# Patient Record
Sex: FEMALE | Race: WHITE
[De-identification: names, ages, dates, MRNs, and addresses within clinical notes are randomized per-mention and may not be internally consistent; named-entity substitution may affect disease eponyms.]

---

## 2021-03-26 ENCOUNTER — HOSPITAL ENCOUNTER (OUTPATIENT)
Dept: HOSPITAL 8 - CVU | Age: 56
Discharge: HOME | End: 2021-03-26
Attending: INTERNAL MEDICINE
Payer: COMMERCIAL

## 2021-03-26 DIAGNOSIS — I08.1: Primary | ICD-10-CM

## 2021-03-26 DIAGNOSIS — R03.0: ICD-10-CM

## 2021-03-26 PROCEDURE — 93306 TTE W/DOPPLER COMPLETE: CPT

## 2022-01-18 ENCOUNTER — PRE-ADMISSION TESTING (OUTPATIENT)
Dept: ADMISSIONS | Facility: MEDICAL CENTER | Age: 57
End: 2022-01-18
Attending: ORTHOPAEDIC SURGERY
Payer: COMMERCIAL

## 2022-01-18 DIAGNOSIS — Z01.812 PRE-OPERATIVE LABORATORY EXAMINATION: ICD-10-CM

## 2022-01-18 DIAGNOSIS — Z01.810 PRE-OPERATIVE CARDIOVASCULAR EXAMINATION: ICD-10-CM

## 2022-01-18 LAB
ANION GAP SERPL CALC-SCNC: 11 MMOL/L (ref 7–16)
BUN SERPL-MCNC: 20 MG/DL (ref 8–22)
CALCIUM SERPL-MCNC: 9.2 MG/DL (ref 8.4–10.2)
CHLORIDE SERPL-SCNC: 104 MMOL/L (ref 96–112)
CO2 SERPL-SCNC: 25 MMOL/L (ref 20–33)
CREAT SERPL-MCNC: 0.8 MG/DL (ref 0.5–1.4)
EKG IMPRESSION: NORMAL
ERYTHROCYTE [DISTWIDTH] IN BLOOD BY AUTOMATED COUNT: 41.1 FL (ref 35.9–50)
GLUCOSE SERPL-MCNC: 81 MG/DL (ref 65–99)
HCT VFR BLD AUTO: 45.6 % (ref 37–47)
HGB BLD-MCNC: 15 G/DL (ref 12–16)
MCH RBC QN AUTO: 30.5 PG (ref 27–33)
MCHC RBC AUTO-ENTMCNC: 32.9 G/DL (ref 33.6–35)
MCV RBC AUTO: 92.7 FL (ref 81.4–97.8)
PLATELET # BLD AUTO: 254 K/UL (ref 164–446)
PMV BLD AUTO: 9.2 FL (ref 9–12.9)
POTASSIUM SERPL-SCNC: 4.1 MMOL/L (ref 3.6–5.5)
RBC # BLD AUTO: 4.92 M/UL (ref 4.2–5.4)
SODIUM SERPL-SCNC: 140 MMOL/L (ref 135–145)
WBC # BLD AUTO: 7.1 K/UL (ref 4.8–10.8)

## 2022-01-18 PROCEDURE — 80048 BASIC METABOLIC PNL TOTAL CA: CPT

## 2022-01-18 PROCEDURE — 36415 COLL VENOUS BLD VENIPUNCTURE: CPT

## 2022-01-18 PROCEDURE — 93005 ELECTROCARDIOGRAM TRACING: CPT

## 2022-01-18 PROCEDURE — 85027 COMPLETE CBC AUTOMATED: CPT

## 2022-01-18 PROCEDURE — 93010 ELECTROCARDIOGRAM REPORT: CPT | Performed by: INTERNAL MEDICINE

## 2022-01-18 PROCEDURE — 87640 STAPH A DNA AMP PROBE: CPT

## 2022-01-18 PROCEDURE — 87641 MR-STAPH DNA AMP PROBE: CPT

## 2022-01-18 RX ORDER — VIT C/B6/B5/MAGNESIUM/HERB 173 50-5-6-5MG
2 CAPSULE ORAL
COMMUNITY

## 2022-01-18 RX ORDER — METOPROLOL SUCCINATE 25 MG/1
25 TABLET, EXTENDED RELEASE ORAL DAILY
COMMUNITY

## 2022-01-18 RX ORDER — ESTRADIOL 0.05 MG/D
1 PATCH, EXTENDED RELEASE TRANSDERMAL
COMMUNITY

## 2022-01-18 RX ORDER — VITAMIN B COMPLEX
5000 TABLET ORAL DAILY
COMMUNITY

## 2022-01-19 NOTE — DISCHARGE PLANNING
DISCHARGE PLANNING NOTE - TOTAL JOINT    Procedure: Procedure(s):  ARTHROPLASTY, SHOULDER, TOTAL - AND REPAIRS AS INDICATED  Procedure Date: 2/1/2022  Insurance: Payor: [employer] / Plan: [employer] CHOICE PLUS / Product Type: *No Product type* /    Equipment currently available at home?  polar ice and oversized shirt.  Steps into the home? 0  Steps within the home? 0  Toilet height? ADA  Type of shower? walk-in shower  Who will be with you during your recovery? Spouse, Daniele.  Is Outpatient Physical Therapy set up after surgery? Yes  Did you take the Total Joint Class and where? Yes received NAON book during appt.  Planning same day discharge?Yes     This writer met with pt during her preadmission appointment. Pt states she has all needed equipment.  Home safety checklist reviewed and copy given to pt. Pt educated to dc criteria. All questions answered and verbalizes understanding of all instructions. No dc needs identified at this time. Anticipate dc to home without barriers.

## 2022-01-20 LAB
SCCMEC + MECA PNL NOSE NAA+PROBE: NEGATIVE
SCCMEC + MECA PNL NOSE NAA+PROBE: POSITIVE

## 2022-01-27 ENCOUNTER — PRE-ADMISSION TESTING (OUTPATIENT)
Dept: ADMISSIONS | Facility: MEDICAL CENTER | Age: 57
End: 2022-01-27
Attending: ORTHOPAEDIC SURGERY
Payer: COMMERCIAL

## 2022-01-27 DIAGNOSIS — Z01.812 PRE-OPERATIVE LABORATORY EXAMINATION: ICD-10-CM

## 2022-01-27 PROCEDURE — U0003 INFECTIOUS AGENT DETECTION BY NUCLEIC ACID (DNA OR RNA); SEVERE ACUTE RESPIRATORY SYNDROME CORONAVIRUS 2 (SARS-COV-2) (CORONAVIRUS DISEASE [COVID-19]), AMPLIFIED PROBE TECHNIQUE, MAKING USE OF HIGH THROUGHPUT TECHNOLOGIES AS DESCRIBED BY CMS-2020-01-R: HCPCS

## 2022-01-27 PROCEDURE — C9803 HOPD COVID-19 SPEC COLLECT: HCPCS

## 2022-01-27 PROCEDURE — U0005 INFEC AGEN DETEC AMPLI PROBE: HCPCS

## 2022-01-28 LAB
SARS-COV-2 RNA RESP QL NAA+PROBE: NOTDETECTED
SPECIMEN SOURCE: NORMAL

## 2022-01-31 ENCOUNTER — ANESTHESIA EVENT (OUTPATIENT)
Dept: SURGERY | Facility: MEDICAL CENTER | Age: 57
End: 2022-01-31
Payer: COMMERCIAL

## 2022-02-01 ENCOUNTER — ANESTHESIA (OUTPATIENT)
Dept: SURGERY | Facility: MEDICAL CENTER | Age: 57
End: 2022-02-01
Payer: COMMERCIAL

## 2022-02-01 ENCOUNTER — ANESTHESIA EVENT (OUTPATIENT)
Dept: SURGERY | Facility: MEDICAL CENTER | Age: 57
DRG: 909 | End: 2022-02-01
Payer: COMMERCIAL

## 2022-02-01 ENCOUNTER — HOSPITAL ENCOUNTER (OUTPATIENT)
Facility: MEDICAL CENTER | Age: 57
End: 2022-02-01
Attending: ORTHOPAEDIC SURGERY | Admitting: ORTHOPAEDIC SURGERY
Payer: COMMERCIAL

## 2022-02-01 ENCOUNTER — HOSPITAL ENCOUNTER (INPATIENT)
Facility: MEDICAL CENTER | Age: 57
LOS: 1 days | DRG: 909 | End: 2022-02-02
Attending: SURGERY | Admitting: SURGERY
Payer: COMMERCIAL

## 2022-02-01 ENCOUNTER — APPOINTMENT (OUTPATIENT)
Dept: RADIOLOGY | Facility: MEDICAL CENTER | Age: 57
End: 2022-02-01
Attending: ORTHOPAEDIC SURGERY
Payer: COMMERCIAL

## 2022-02-01 ENCOUNTER — ANESTHESIA (OUTPATIENT)
Dept: SURGERY | Facility: MEDICAL CENTER | Age: 57
DRG: 909 | End: 2022-02-01
Payer: COMMERCIAL

## 2022-02-01 VITALS
TEMPERATURE: 96.8 F | BODY MASS INDEX: 30.79 KG/M2 | OXYGEN SATURATION: 96 % | RESPIRATION RATE: 22 BRPM | DIASTOLIC BLOOD PRESSURE: 58 MMHG | WEIGHT: 180.34 LBS | SYSTOLIC BLOOD PRESSURE: 109 MMHG | HEIGHT: 64 IN | HEART RATE: 106 BPM

## 2022-02-01 PROBLEM — S45.002A: Status: ACTIVE | Noted: 2022-02-01

## 2022-02-01 PROBLEM — M19.019 SHOULDER ARTHRITIS: Status: ACTIVE | Noted: 2022-02-01

## 2022-02-01 LAB
ABO GROUP BLD: NORMAL
ABO GROUP BLD: NORMAL
BASOPHILS # BLD AUTO: 0.2 % (ref 0–1.8)
BASOPHILS # BLD: 0.03 K/UL (ref 0–0.12)
BLD GP AB SCN SERPL QL: NORMAL
BLD GP AB SCN SERPL QL: NORMAL
EOSINOPHIL # BLD AUTO: 0 K/UL (ref 0–0.51)
EOSINOPHIL NFR BLD: 0 % (ref 0–6.9)
ERYTHROCYTE [DISTWIDTH] IN BLOOD BY AUTOMATED COUNT: 40.9 FL (ref 35.9–50)
HCT VFR BLD AUTO: 36.6 % (ref 37–47)
HGB BLD-MCNC: 12.2 G/DL (ref 12–16)
IMM GRANULOCYTES # BLD AUTO: 0.04 K/UL (ref 0–0.11)
IMM GRANULOCYTES NFR BLD AUTO: 0.3 % (ref 0–0.9)
LYMPHOCYTES # BLD AUTO: 0.74 K/UL (ref 1–4.8)
LYMPHOCYTES NFR BLD: 5.9 % (ref 22–41)
MCH RBC QN AUTO: 30.5 PG (ref 27–33)
MCHC RBC AUTO-ENTMCNC: 33.3 G/DL (ref 33.6–35)
MCV RBC AUTO: 91.5 FL (ref 81.4–97.8)
MONOCYTES # BLD AUTO: 0.33 K/UL (ref 0–0.85)
MONOCYTES NFR BLD AUTO: 2.6 % (ref 0–13.4)
NEUTROPHILS # BLD AUTO: 11.43 K/UL (ref 2–7.15)
NEUTROPHILS NFR BLD: 91 % (ref 44–72)
NRBC # BLD AUTO: 0 K/UL
NRBC BLD-RTO: 0 /100 WBC
PLATELET # BLD AUTO: 251 K/UL (ref 164–446)
PMV BLD AUTO: 8.9 FL (ref 9–12.9)
RBC # BLD AUTO: 4 M/UL (ref 4.2–5.4)
RH BLD: NORMAL
RH BLD: NORMAL
WBC # BLD AUTO: 12.6 K/UL (ref 4.8–10.8)

## 2022-02-01 PROCEDURE — 700105 HCHG RX REV CODE 258: Performed by: ANESTHESIOLOGY

## 2022-02-01 PROCEDURE — 700111 HCHG RX REV CODE 636 W/ 250 OVERRIDE (IP): Performed by: ANESTHESIOLOGY

## 2022-02-01 PROCEDURE — 700101 HCHG RX REV CODE 250: Performed by: SURGERY

## 2022-02-01 PROCEDURE — 700102 HCHG RX REV CODE 250 W/ 637 OVERRIDE(OP): Performed by: ANESTHESIOLOGY

## 2022-02-01 PROCEDURE — 35860 EXPLORE LIMB VESSELS: CPT | Performed by: SURGERY

## 2022-02-01 PROCEDURE — A9270 NON-COVERED ITEM OR SERVICE: HCPCS | Performed by: ANESTHESIOLOGY

## 2022-02-01 PROCEDURE — C1757 CATH, THROMBECTOMY/EMBOLECT: HCPCS | Performed by: SURGERY

## 2022-02-01 PROCEDURE — 700111 HCHG RX REV CODE 636 W/ 250 OVERRIDE (IP): Performed by: SURGERY

## 2022-02-01 PROCEDURE — 500562 HCHG FIBERWIRE: Performed by: ORTHOPAEDIC SURGERY

## 2022-02-01 PROCEDURE — 73206 CT ANGIO UPR EXTRM W/O&W/DYE: CPT | Mod: LT

## 2022-02-01 PROCEDURE — 160009 HCHG ANES TIME/MIN: Performed by: SURGERY

## 2022-02-01 PROCEDURE — 500257: Performed by: SURGERY

## 2022-02-01 PROCEDURE — 160036 HCHG PACU - EA ADDL 30 MINS PHASE I: Performed by: ORTHOPAEDIC SURGERY

## 2022-02-01 PROCEDURE — 160035 HCHG PACU - 1ST 60 MINS PHASE I: Performed by: SURGERY

## 2022-02-01 PROCEDURE — 160009 HCHG ANES TIME/MIN: Performed by: ORTHOPAEDIC SURGERY

## 2022-02-01 PROCEDURE — 502240 HCHG MISC OR SUPPLY RC 0272: Performed by: ORTHOPAEDIC SURGERY

## 2022-02-01 PROCEDURE — 700111 HCHG RX REV CODE 636 W/ 250 OVERRIDE (IP): Performed by: ORTHOPAEDIC SURGERY

## 2022-02-01 PROCEDURE — 770001 HCHG ROOM/CARE - MED/SURG/GYN PRIV*

## 2022-02-01 PROCEDURE — 160036 HCHG PACU - EA ADDL 30 MINS PHASE I: Performed by: SURGERY

## 2022-02-01 PROCEDURE — 160002 HCHG RECOVERY MINUTES (STAT): Performed by: SURGERY

## 2022-02-01 PROCEDURE — 160048 HCHG OR STATISTICAL LEVEL 1-5: Performed by: ORTHOPAEDIC SURGERY

## 2022-02-01 PROCEDURE — 64415 NJX AA&/STRD BRCH PLXS IMG: CPT | Performed by: ORTHOPAEDIC SURGERY

## 2022-02-01 PROCEDURE — 700117 HCHG RX CONTRAST REV CODE 255: Performed by: ORTHOPAEDIC SURGERY

## 2022-02-01 PROCEDURE — 110454 HCHG SHELL REV 250: Performed by: SURGERY

## 2022-02-01 PROCEDURE — 86850 RBC ANTIBODY SCREEN: CPT

## 2022-02-01 PROCEDURE — 700101 HCHG RX REV CODE 250: Performed by: ANESTHESIOLOGY

## 2022-02-01 PROCEDURE — L8699 PROSTHETIC IMPLANT NOS: HCPCS | Performed by: ORTHOPAEDIC SURGERY

## 2022-02-01 PROCEDURE — C1776 JOINT DEVICE (IMPLANTABLE): HCPCS | Performed by: ORTHOPAEDIC SURGERY

## 2022-02-01 PROCEDURE — 160029 HCHG SURGERY MINUTES - 1ST 30 MINS LEVEL 4: Performed by: SURGERY

## 2022-02-01 PROCEDURE — 700105 HCHG RX REV CODE 258: Performed by: ORTHOPAEDIC SURGERY

## 2022-02-01 PROCEDURE — 34101 REMOVAL OF ARTERY CLOT: CPT | Performed by: SURGERY

## 2022-02-01 PROCEDURE — 160035 HCHG PACU - 1ST 60 MINS PHASE I: Performed by: ORTHOPAEDIC SURGERY

## 2022-02-01 PROCEDURE — A9270 NON-COVERED ITEM OR SERVICE: HCPCS | Performed by: SURGERY

## 2022-02-01 PROCEDURE — 160041 HCHG SURGERY MINUTES - EA ADDL 1 MIN LEVEL 4: Performed by: ORTHOPAEDIC SURGERY

## 2022-02-01 PROCEDURE — 700101 HCHG RX REV CODE 250: Performed by: ORTHOPAEDIC SURGERY

## 2022-02-01 PROCEDURE — 99226 PR SUBSEQUENT OBSERVATION CARE,LEVEL III: CPT | Mod: 57 | Performed by: SURGERY

## 2022-02-01 PROCEDURE — 86901 BLOOD TYPING SEROLOGIC RH(D): CPT

## 2022-02-01 PROCEDURE — 700105 HCHG RX REV CODE 258: Performed by: SURGERY

## 2022-02-01 PROCEDURE — 502000 HCHG MISC OR IMPLANTS RC 0278: Performed by: ORTHOPAEDIC SURGERY

## 2022-02-01 PROCEDURE — 160041 HCHG SURGERY MINUTES - EA ADDL 1 MIN LEVEL 4: Performed by: SURGERY

## 2022-02-01 PROCEDURE — 73020 X-RAY EXAM OF SHOULDER: CPT | Mod: LT

## 2022-02-01 PROCEDURE — 501838 HCHG SUTURE GENERAL: Performed by: SURGERY

## 2022-02-01 PROCEDURE — 160029 HCHG SURGERY MINUTES - 1ST 30 MINS LEVEL 4: Performed by: ORTHOPAEDIC SURGERY

## 2022-02-01 PROCEDURE — 85025 COMPLETE CBC W/AUTO DIFF WBC: CPT

## 2022-02-01 PROCEDURE — 86901 BLOOD TYPING SEROLOGIC RH(D): CPT | Mod: 91

## 2022-02-01 PROCEDURE — 501838 HCHG SUTURE GENERAL: Performed by: ORTHOPAEDIC SURGERY

## 2022-02-01 PROCEDURE — 160002 HCHG RECOVERY MINUTES (STAT): Performed by: ORTHOPAEDIC SURGERY

## 2022-02-01 PROCEDURE — 501497 HCHG SURGICLIP: Performed by: ORTHOPAEDIC SURGERY

## 2022-02-01 PROCEDURE — 700102 HCHG RX REV CODE 250 W/ 637 OVERRIDE(OP): Performed by: SURGERY

## 2022-02-01 PROCEDURE — 501837 HCHG SUTURE CV: Performed by: SURGERY

## 2022-02-01 PROCEDURE — 160048 HCHG OR STATISTICAL LEVEL 1-5: Performed by: SURGERY

## 2022-02-01 PROCEDURE — 86850 RBC ANTIBODY SCREEN: CPT | Mod: 91

## 2022-02-01 PROCEDURE — 86900 BLOOD TYPING SEROLOGIC ABO: CPT

## 2022-02-01 PROCEDURE — C1713 ANCHOR/SCREW BN/BN,TIS/BN: HCPCS | Performed by: ORTHOPAEDIC SURGERY

## 2022-02-01 DEVICE — IMPLANTABLE DEVICE: Type: IMPLANTABLE DEVICE | Site: SHOULDER | Status: FUNCTIONAL

## 2022-02-01 DEVICE — ANCHOR SUTURE ICONIX 3 WITH 3 STRANDS #2 FORCE FIBER 2.3MM (5EA/BX): Type: IMPLANTABLE DEVICE | Site: SHOULDER | Status: FUNCTIONAL

## 2022-02-01 DEVICE — BONE CEMENT SIMPLEX ANTIBIO - (10/PK): Type: IMPLANTABLE DEVICE | Site: SHOULDER | Status: FUNCTIONAL

## 2022-02-01 RX ORDER — DEXAMETHASONE SODIUM PHOSPHATE 4 MG/ML
INJECTION, SOLUTION INTRA-ARTICULAR; INTRALESIONAL; INTRAMUSCULAR; INTRAVENOUS; SOFT TISSUE PRN
Status: DISCONTINUED | OUTPATIENT
Start: 2022-02-01 | End: 2022-02-01 | Stop reason: SURG

## 2022-02-01 RX ORDER — CEFAZOLIN SODIUM 1 G/3ML
INJECTION, POWDER, FOR SOLUTION INTRAMUSCULAR; INTRAVENOUS PRN
Status: DISCONTINUED | OUTPATIENT
Start: 2022-02-01 | End: 2022-02-01 | Stop reason: SURG

## 2022-02-01 RX ORDER — ACETAMINOPHEN 500 MG
1000 TABLET ORAL EVERY 6 HOURS
Status: DISCONTINUED | OUTPATIENT
Start: 2022-02-02 | End: 2022-02-02 | Stop reason: HOSPADM

## 2022-02-01 RX ORDER — HYDROMORPHONE HYDROCHLORIDE 1 MG/ML
0.4 INJECTION, SOLUTION INTRAMUSCULAR; INTRAVENOUS; SUBCUTANEOUS
Status: DISCONTINUED | OUTPATIENT
Start: 2022-02-01 | End: 2022-02-01 | Stop reason: HOSPADM

## 2022-02-01 RX ORDER — HYDROMORPHONE HYDROCHLORIDE 1 MG/ML
0.2 INJECTION, SOLUTION INTRAMUSCULAR; INTRAVENOUS; SUBCUTANEOUS
Status: DISCONTINUED | OUTPATIENT
Start: 2022-02-01 | End: 2022-02-01 | Stop reason: HOSPADM

## 2022-02-01 RX ORDER — HALOPERIDOL 5 MG/ML
1 INJECTION INTRAMUSCULAR EVERY 6 HOURS PRN
Status: CANCELLED | OUTPATIENT
Start: 2022-02-01

## 2022-02-01 RX ORDER — TRANEXAMIC ACID 100 MG/ML
INJECTION, SOLUTION INTRAVENOUS PRN
Status: DISCONTINUED | OUTPATIENT
Start: 2022-02-01 | End: 2022-02-01 | Stop reason: SURG

## 2022-02-01 RX ORDER — METOPROLOL SUCCINATE 25 MG/1
25 TABLET, EXTENDED RELEASE ORAL DAILY
Status: CANCELLED | OUTPATIENT
Start: 2022-02-01

## 2022-02-01 RX ORDER — HYDROMORPHONE HYDROCHLORIDE 1 MG/ML
0.1 INJECTION, SOLUTION INTRAMUSCULAR; INTRAVENOUS; SUBCUTANEOUS
Status: DISCONTINUED | OUTPATIENT
Start: 2022-02-01 | End: 2022-02-01 | Stop reason: HOSPADM

## 2022-02-01 RX ORDER — DEXTROSE MONOHYDRATE, SODIUM CHLORIDE, AND POTASSIUM CHLORIDE 50; 1.49; 4.5 G/1000ML; G/1000ML; G/1000ML
INJECTION, SOLUTION INTRAVENOUS CONTINUOUS
Status: DISPENSED | OUTPATIENT
Start: 2022-02-01 | End: 2022-02-02

## 2022-02-01 RX ORDER — POLYETHYLENE GLYCOL 3350 17 G/17G
1 POWDER, FOR SOLUTION ORAL 2 TIMES DAILY PRN
Status: CANCELLED | OUTPATIENT
Start: 2022-02-01

## 2022-02-01 RX ORDER — ACETAMINOPHEN 500 MG
1000 TABLET ORAL EVERY 6 HOURS PRN
Status: DISCONTINUED | OUTPATIENT
Start: 2022-02-07 | End: 2022-02-02 | Stop reason: HOSPADM

## 2022-02-01 RX ORDER — FAMOTIDINE 20 MG/1
20 TABLET, FILM COATED ORAL 2 TIMES DAILY
Status: DISCONTINUED | OUTPATIENT
Start: 2022-02-01 | End: 2022-02-02 | Stop reason: HOSPADM

## 2022-02-01 RX ORDER — DEXAMETHASONE SODIUM PHOSPHATE 4 MG/ML
4 INJECTION, SOLUTION INTRA-ARTICULAR; INTRALESIONAL; INTRAMUSCULAR; INTRAVENOUS; SOFT TISSUE
Status: CANCELLED | OUTPATIENT
Start: 2022-02-01

## 2022-02-01 RX ORDER — ACETAMINOPHEN 500 MG
1000 TABLET ORAL ONCE
Status: COMPLETED | OUTPATIENT
Start: 2022-02-01 | End: 2022-02-01

## 2022-02-01 RX ORDER — AMOXICILLIN 250 MG
1 CAPSULE ORAL
Status: CANCELLED | OUTPATIENT
Start: 2022-02-01

## 2022-02-01 RX ORDER — OXYCODONE HCL 5 MG/5 ML
5 SOLUTION, ORAL ORAL
Status: COMPLETED | OUTPATIENT
Start: 2022-02-01 | End: 2022-02-01

## 2022-02-01 RX ORDER — ONDANSETRON 2 MG/ML
4 INJECTION INTRAMUSCULAR; INTRAVENOUS
Status: DISCONTINUED | OUTPATIENT
Start: 2022-02-01 | End: 2022-02-01 | Stop reason: HOSPADM

## 2022-02-01 RX ORDER — HALOPERIDOL 5 MG/ML
1 INJECTION INTRAMUSCULAR EVERY 6 HOURS PRN
Status: DISCONTINUED | OUTPATIENT
Start: 2022-02-01 | End: 2022-02-02 | Stop reason: HOSPADM

## 2022-02-01 RX ORDER — HYDRALAZINE HYDROCHLORIDE 20 MG/ML
5 INJECTION INTRAMUSCULAR; INTRAVENOUS
Status: DISCONTINUED | OUTPATIENT
Start: 2022-02-01 | End: 2022-02-01 | Stop reason: HOSPADM

## 2022-02-01 RX ORDER — SODIUM CHLORIDE, SODIUM LACTATE, POTASSIUM CHLORIDE, CALCIUM CHLORIDE 600; 310; 30; 20 MG/100ML; MG/100ML; MG/100ML; MG/100ML
INJECTION, SOLUTION INTRAVENOUS
Status: DISCONTINUED | OUTPATIENT
Start: 2022-02-01 | End: 2022-02-01 | Stop reason: SURG

## 2022-02-01 RX ORDER — MEPERIDINE HYDROCHLORIDE 25 MG/ML
6.25 INJECTION INTRAMUSCULAR; INTRAVENOUS; SUBCUTANEOUS
Status: DISCONTINUED | OUTPATIENT
Start: 2022-02-01 | End: 2022-02-01 | Stop reason: HOSPADM

## 2022-02-01 RX ORDER — OXYCODONE HYDROCHLORIDE 10 MG/1
10 TABLET ORAL
Status: DISCONTINUED | OUTPATIENT
Start: 2022-02-01 | End: 2022-02-02 | Stop reason: HOSPADM

## 2022-02-01 RX ORDER — DIPHENHYDRAMINE HYDROCHLORIDE 50 MG/ML
25 INJECTION INTRAMUSCULAR; INTRAVENOUS EVERY 6 HOURS PRN
Status: CANCELLED | OUTPATIENT
Start: 2022-02-01

## 2022-02-01 RX ORDER — MIDAZOLAM HYDROCHLORIDE 1 MG/ML
INJECTION INTRAMUSCULAR; INTRAVENOUS PRN
Status: DISCONTINUED | OUTPATIENT
Start: 2022-02-01 | End: 2022-02-01 | Stop reason: SURG

## 2022-02-01 RX ORDER — SODIUM CHLORIDE, SODIUM LACTATE, POTASSIUM CHLORIDE, CALCIUM CHLORIDE 600; 310; 30; 20 MG/100ML; MG/100ML; MG/100ML; MG/100ML
INJECTION, SOLUTION INTRAVENOUS CONTINUOUS
Status: ACTIVE | OUTPATIENT
Start: 2022-02-01 | End: 2022-02-01

## 2022-02-01 RX ORDER — LABETALOL HYDROCHLORIDE 5 MG/ML
5 INJECTION, SOLUTION INTRAVENOUS
Status: DISCONTINUED | OUTPATIENT
Start: 2022-02-01 | End: 2022-02-01 | Stop reason: HOSPADM

## 2022-02-01 RX ORDER — HALOPERIDOL 5 MG/ML
1 INJECTION INTRAMUSCULAR
Status: DISCONTINUED | OUTPATIENT
Start: 2022-02-01 | End: 2022-02-01 | Stop reason: HOSPADM

## 2022-02-01 RX ORDER — DOCUSATE SODIUM 100 MG/1
100 CAPSULE, LIQUID FILLED ORAL 2 TIMES DAILY
Status: DISCONTINUED | OUTPATIENT
Start: 2022-02-01 | End: 2022-02-02 | Stop reason: HOSPADM

## 2022-02-01 RX ORDER — DIPHENHYDRAMINE HYDROCHLORIDE 50 MG/ML
25 INJECTION INTRAMUSCULAR; INTRAVENOUS EVERY 6 HOURS PRN
Status: DISCONTINUED | OUTPATIENT
Start: 2022-02-01 | End: 2022-02-02 | Stop reason: HOSPADM

## 2022-02-01 RX ORDER — DIPHENHYDRAMINE HYDROCHLORIDE 50 MG/ML
12.5 INJECTION INTRAMUSCULAR; INTRAVENOUS
Status: DISCONTINUED | OUTPATIENT
Start: 2022-02-01 | End: 2022-02-01 | Stop reason: HOSPADM

## 2022-02-01 RX ORDER — SCOLOPAMINE TRANSDERMAL SYSTEM 1 MG/1
1 PATCH, EXTENDED RELEASE TRANSDERMAL
Status: CANCELLED | OUTPATIENT
Start: 2022-02-01

## 2022-02-01 RX ORDER — IPRATROPIUM BROMIDE AND ALBUTEROL SULFATE 2.5; .5 MG/3ML; MG/3ML
3 SOLUTION RESPIRATORY (INHALATION)
Status: DISCONTINUED | OUTPATIENT
Start: 2022-02-01 | End: 2022-02-02 | Stop reason: HOSPADM

## 2022-02-01 RX ORDER — OXYCODONE HYDROCHLORIDE 10 MG/1
10 TABLET ORAL
Status: CANCELLED | OUTPATIENT
Start: 2022-02-01

## 2022-02-01 RX ORDER — METOPROLOL SUCCINATE 25 MG/1
25 TABLET, EXTENDED RELEASE ORAL DAILY
Status: DISCONTINUED | OUTPATIENT
Start: 2022-02-02 | End: 2022-02-02 | Stop reason: HOSPADM

## 2022-02-01 RX ORDER — LIDOCAINE HYDROCHLORIDE 20 MG/ML
INJECTION, SOLUTION EPIDURAL; INFILTRATION; INTRACAUDAL; PERINEURAL PRN
Status: DISCONTINUED | OUTPATIENT
Start: 2022-02-01 | End: 2022-02-01 | Stop reason: SURG

## 2022-02-01 RX ORDER — ENEMA 19; 7 G/133ML; G/133ML
1 ENEMA RECTAL
Status: CANCELLED | OUTPATIENT
Start: 2022-02-01

## 2022-02-01 RX ORDER — LIDOCAINE HYDROCHLORIDE 40 MG/ML
SOLUTION TOPICAL PRN
Status: DISCONTINUED | OUTPATIENT
Start: 2022-02-01 | End: 2022-02-01 | Stop reason: SURG

## 2022-02-01 RX ORDER — SODIUM CHLORIDE, SODIUM LACTATE, POTASSIUM CHLORIDE, CALCIUM CHLORIDE 600; 310; 30; 20 MG/100ML; MG/100ML; MG/100ML; MG/100ML
INJECTION, SOLUTION INTRAVENOUS CONTINUOUS
Status: DISCONTINUED | OUTPATIENT
Start: 2022-02-01 | End: 2022-02-01 | Stop reason: HOSPADM

## 2022-02-01 RX ORDER — ASPIRIN 325 MG
325 TABLET ORAL DAILY
Status: DISCONTINUED | OUTPATIENT
Start: 2022-02-02 | End: 2022-02-02 | Stop reason: HOSPADM

## 2022-02-01 RX ORDER — HYDROMORPHONE HYDROCHLORIDE 1 MG/ML
0.5 INJECTION, SOLUTION INTRAMUSCULAR; INTRAVENOUS; SUBCUTANEOUS
Status: CANCELLED | OUTPATIENT
Start: 2022-02-01

## 2022-02-01 RX ORDER — OXYCODONE HCL 5 MG/5 ML
10 SOLUTION, ORAL ORAL
Status: DISCONTINUED | OUTPATIENT
Start: 2022-02-01 | End: 2022-02-01 | Stop reason: HOSPADM

## 2022-02-01 RX ORDER — PHENYLEPHRINE HCL IN 0.9% NACL 0.5 MG/5ML
SYRINGE (ML) INTRAVENOUS PRN
Status: DISCONTINUED | OUTPATIENT
Start: 2022-02-01 | End: 2022-02-01 | Stop reason: SURG

## 2022-02-01 RX ORDER — IBUPROFEN 800 MG/1
800 TABLET ORAL 3 TIMES DAILY PRN
Status: DISCONTINUED | OUTPATIENT
Start: 2022-02-05 | End: 2022-02-02 | Stop reason: HOSPADM

## 2022-02-01 RX ORDER — DOCUSATE SODIUM 100 MG/1
100 CAPSULE, LIQUID FILLED ORAL 2 TIMES DAILY
Status: CANCELLED | OUTPATIENT
Start: 2022-02-01

## 2022-02-01 RX ORDER — DEXAMETHASONE SODIUM PHOSPHATE 4 MG/ML
4 INJECTION, SOLUTION INTRA-ARTICULAR; INTRALESIONAL; INTRAMUSCULAR; INTRAVENOUS; SOFT TISSUE
Status: DISCONTINUED | OUTPATIENT
Start: 2022-02-01 | End: 2022-02-02 | Stop reason: HOSPADM

## 2022-02-01 RX ORDER — HEPARIN SODIUM,PORCINE 1000/ML
VIAL (ML) INJECTION PRN
Status: DISCONTINUED | OUTPATIENT
Start: 2022-02-01 | End: 2022-02-01 | Stop reason: SURG

## 2022-02-01 RX ORDER — OXYCODONE HCL 5 MG/5 ML
10 SOLUTION, ORAL ORAL
Status: COMPLETED | OUTPATIENT
Start: 2022-02-01 | End: 2022-02-01

## 2022-02-01 RX ORDER — ROCURONIUM BROMIDE 10 MG/ML
INJECTION, SOLUTION INTRAVENOUS PRN
Status: DISCONTINUED | OUTPATIENT
Start: 2022-02-01 | End: 2022-02-01 | Stop reason: SURG

## 2022-02-01 RX ORDER — SCOLOPAMINE TRANSDERMAL SYSTEM 1 MG/1
1 PATCH, EXTENDED RELEASE TRANSDERMAL
Status: DISCONTINUED | OUTPATIENT
Start: 2022-02-01 | End: 2022-02-02 | Stop reason: HOSPADM

## 2022-02-01 RX ORDER — OXYCODONE HYDROCHLORIDE 5 MG/1
5 TABLET ORAL
Status: DISCONTINUED | OUTPATIENT
Start: 2022-02-01 | End: 2022-02-02 | Stop reason: HOSPADM

## 2022-02-01 RX ORDER — ONDANSETRON 2 MG/ML
4 INJECTION INTRAMUSCULAR; INTRAVENOUS EVERY 4 HOURS PRN
Status: DISCONTINUED | OUTPATIENT
Start: 2022-02-01 | End: 2022-02-02 | Stop reason: HOSPADM

## 2022-02-01 RX ORDER — KETOROLAC TROMETHAMINE 30 MG/ML
30 INJECTION, SOLUTION INTRAMUSCULAR; INTRAVENOUS EVERY 6 HOURS
Status: DISCONTINUED | OUTPATIENT
Start: 2022-02-02 | End: 2022-02-02 | Stop reason: HOSPADM

## 2022-02-01 RX ORDER — VANCOMYCIN HYDROCHLORIDE 1 G/20ML
INJECTION, POWDER, LYOPHILIZED, FOR SOLUTION INTRAVENOUS
Status: COMPLETED | OUTPATIENT
Start: 2022-02-01 | End: 2022-02-01

## 2022-02-01 RX ORDER — ACETAMINOPHEN 500 MG
1000 TABLET ORAL EVERY 6 HOURS PRN
Status: CANCELLED | OUTPATIENT
Start: 2022-02-06

## 2022-02-01 RX ORDER — BUPIVACAINE HYDROCHLORIDE 5 MG/ML
INJECTION, SOLUTION EPIDURAL; INTRACAUDAL PRN
Status: DISCONTINUED | OUTPATIENT
Start: 2022-02-01 | End: 2022-02-01 | Stop reason: SURG

## 2022-02-01 RX ORDER — ACETAMINOPHEN 500 MG
1000 TABLET ORAL EVERY 6 HOURS
Status: CANCELLED | OUTPATIENT
Start: 2022-02-01 | End: 2022-02-06

## 2022-02-01 RX ORDER — ONDANSETRON 2 MG/ML
INJECTION INTRAMUSCULAR; INTRAVENOUS PRN
Status: DISCONTINUED | OUTPATIENT
Start: 2022-02-01 | End: 2022-02-01 | Stop reason: SURG

## 2022-02-01 RX ORDER — BISACODYL 10 MG
10 SUPPOSITORY, RECTAL RECTAL
Status: CANCELLED | OUTPATIENT
Start: 2022-02-01

## 2022-02-01 RX ORDER — HEPARIN SODIUM 5000 [USP'U]/100ML
INJECTION, SOLUTION INTRAVENOUS
Status: DISCONTINUED | OUTPATIENT
Start: 2022-02-01 | End: 2022-02-01 | Stop reason: SURG

## 2022-02-01 RX ORDER — OXYCODONE HCL 5 MG/5 ML
5 SOLUTION, ORAL ORAL
Status: DISCONTINUED | OUTPATIENT
Start: 2022-02-01 | End: 2022-02-01 | Stop reason: HOSPADM

## 2022-02-01 RX ORDER — AMOXICILLIN 250 MG
1 CAPSULE ORAL NIGHTLY
Status: CANCELLED | OUTPATIENT
Start: 2022-02-01

## 2022-02-01 RX ORDER — OXYCODONE HYDROCHLORIDE 5 MG/1
5 TABLET ORAL
Status: CANCELLED | OUTPATIENT
Start: 2022-02-01

## 2022-02-01 RX ORDER — PHENYLEPHRINE HYDROCHLORIDE 10 MG/ML
INJECTION, SOLUTION INTRAMUSCULAR; INTRAVENOUS; SUBCUTANEOUS PRN
Status: DISCONTINUED | OUTPATIENT
Start: 2022-02-01 | End: 2022-02-01 | Stop reason: SURG

## 2022-02-01 RX ORDER — SCOLOPAMINE TRANSDERMAL SYSTEM 1 MG/1
1 PATCH, EXTENDED RELEASE TRANSDERMAL
Status: DISCONTINUED | OUTPATIENT
Start: 2022-02-01 | End: 2022-02-01 | Stop reason: HOSPADM

## 2022-02-01 RX ORDER — SODIUM CHLORIDE, SODIUM GLUCONATE, SODIUM ACETATE, POTASSIUM CHLORIDE AND MAGNESIUM CHLORIDE 526; 502; 368; 37; 30 MG/100ML; MG/100ML; MG/100ML; MG/100ML; MG/100ML
INJECTION, SOLUTION INTRAVENOUS
Status: DISCONTINUED | OUTPATIENT
Start: 2022-02-01 | End: 2022-02-01 | Stop reason: SURG

## 2022-02-01 RX ORDER — ONDANSETRON 2 MG/ML
4 INJECTION INTRAMUSCULAR; INTRAVENOUS EVERY 4 HOURS PRN
Status: CANCELLED | OUTPATIENT
Start: 2022-02-01

## 2022-02-01 RX ADMIN — SUGAMMADEX 200 MG: 100 INJECTION, SOLUTION INTRAVENOUS at 19:25

## 2022-02-01 RX ADMIN — SUGAMMADEX 200 MG: 100 INJECTION, SOLUTION INTRAVENOUS at 10:48

## 2022-02-01 RX ADMIN — PHENYLEPHRINE HYDROCHLORIDE 100 MCG: 10 INJECTION INTRAVENOUS at 17:36

## 2022-02-01 RX ADMIN — ONDANSETRON 4 MG: 2 INJECTION INTRAMUSCULAR; INTRAVENOUS at 19:08

## 2022-02-01 RX ADMIN — PROPOFOL 20 MG: 10 INJECTION, EMULSION INTRAVENOUS at 19:22

## 2022-02-01 RX ADMIN — DEXAMETHASONE SODIUM PHOSPHATE 8 MG: 4 INJECTION, SOLUTION INTRAMUSCULAR; INTRAVENOUS at 09:16

## 2022-02-01 RX ADMIN — ACETAMINOPHEN 1000 MG: 500 TABLET, FILM COATED ORAL at 08:17

## 2022-02-01 RX ADMIN — ROCURONIUM BROMIDE 50 MG: 10 INJECTION, SOLUTION INTRAVENOUS at 17:09

## 2022-02-01 RX ADMIN — BUPIVACAINE HYDROCHLORIDE 10 ML: 5 INJECTION, SOLUTION EPIDURAL; INTRACAUDAL; PERINEURAL at 09:00

## 2022-02-01 RX ADMIN — HEPARIN SODIUM 3000 UNITS: 1000 INJECTION, SOLUTION INTRAVENOUS; SUBCUTANEOUS at 18:38

## 2022-02-01 RX ADMIN — PHENYLEPHRINE HYDROCHLORIDE 100 MCG: 10 INJECTION INTRAVENOUS at 17:15

## 2022-02-01 RX ADMIN — EPHEDRINE SULFATE 10 MG: 50 INJECTION INTRAMUSCULAR; INTRAVENOUS; SUBCUTANEOUS at 09:31

## 2022-02-01 RX ADMIN — PHENYLEPHRINE HYDROCHLORIDE 200 MCG: 10 INJECTION INTRAVENOUS at 17:25

## 2022-02-01 RX ADMIN — HEPARIN SODIUM 500 UNITS/HR: 5000 INJECTION, SOLUTION INTRAVENOUS at 13:50

## 2022-02-01 RX ADMIN — POTASSIUM CHLORIDE, DEXTROSE MONOHYDRATE AND SODIUM CHLORIDE: 150; 5; 450 INJECTION, SOLUTION INTRAVENOUS at 21:59

## 2022-02-01 RX ADMIN — SODIUM CHLORIDE, SODIUM GLUCONATE, SODIUM ACETATE, POTASSIUM CHLORIDE AND MAGNESIUM CHLORIDE: 526; 502; 368; 37; 30 INJECTION, SOLUTION INTRAVENOUS at 09:55

## 2022-02-01 RX ADMIN — PROPOFOL 150 MG: 10 INJECTION, EMULSION INTRAVENOUS at 17:09

## 2022-02-01 RX ADMIN — SCOPALAMINE 1 PATCH: 1 PATCH, EXTENDED RELEASE TRANSDERMAL at 08:17

## 2022-02-01 RX ADMIN — Medication 100 MCG: at 09:35

## 2022-02-01 RX ADMIN — FENTANYL CITRATE 50 MCG: 50 INJECTION, SOLUTION INTRAMUSCULAR; INTRAVENOUS at 09:14

## 2022-02-01 RX ADMIN — PROPOFOL 30 MG: 10 INJECTION, EMULSION INTRAVENOUS at 19:28

## 2022-02-01 RX ADMIN — ROCURONIUM BROMIDE 70 MG: 10 INJECTION, SOLUTION INTRAVENOUS at 09:16

## 2022-02-01 RX ADMIN — PHENYLEPHRINE HYDROCHLORIDE 100 MCG: 10 INJECTION INTRAVENOUS at 17:40

## 2022-02-01 RX ADMIN — PHENYLEPHRINE HYDROCHLORIDE 50 MCG/MIN: 10 INJECTION INTRAVENOUS at 18:01

## 2022-02-01 RX ADMIN — FAMOTIDINE 20 MG: 20 TABLET ORAL at 22:00

## 2022-02-01 RX ADMIN — ROCURONIUM BROMIDE 20 MG: 10 INJECTION, SOLUTION INTRAVENOUS at 10:23

## 2022-02-01 RX ADMIN — FENTANYL CITRATE 50 MCG: 50 INJECTION, SOLUTION INTRAMUSCULAR; INTRAVENOUS at 18:57

## 2022-02-01 RX ADMIN — Medication 200 MCG: at 10:21

## 2022-02-01 RX ADMIN — LIDOCAINE HYDROCHLORIDE 3 ML: 40 SOLUTION TOPICAL at 17:11

## 2022-02-01 RX ADMIN — LIDOCAINE HYDROCHLORIDE 1 ML: 20 INJECTION, SOLUTION EPIDURAL; INFILTRATION; INTRACAUDAL; PERINEURAL at 09:00

## 2022-02-01 RX ADMIN — BUPIVACAINE 10 ML: 13.3 INJECTION, SUSPENSION, LIPOSOMAL INFILTRATION at 09:00

## 2022-02-01 RX ADMIN — LIDOCAINE HYDROCHLORIDE 40 MG: 20 INJECTION, SOLUTION EPIDURAL; INFILTRATION; INTRACAUDAL at 17:09

## 2022-02-01 RX ADMIN — SODIUM CHLORIDE, POTASSIUM CHLORIDE, SODIUM LACTATE AND CALCIUM CHLORIDE: 600; 310; 30; 20 INJECTION, SOLUTION INTRAVENOUS at 08:17

## 2022-02-01 RX ADMIN — POTASSIUM CHLORIDE, DEXTROSE MONOHYDRATE AND SODIUM CHLORIDE: 150; 5; 450 INJECTION, SOLUTION INTRAVENOUS at 22:01

## 2022-02-01 RX ADMIN — HEPARIN SODIUM 5000 UNITS: 1000 INJECTION, SOLUTION INTRAVENOUS; SUBCUTANEOUS at 17:47

## 2022-02-01 RX ADMIN — DEXAMETHASONE SODIUM PHOSPHATE 4 MG: 4 INJECTION, SOLUTION INTRA-ARTICULAR; INTRALESIONAL; INTRAMUSCULAR; INTRAVENOUS; SOFT TISSUE at 17:13

## 2022-02-01 RX ADMIN — PROPOFOL 150 MG: 10 INJECTION, EMULSION INTRAVENOUS at 09:16

## 2022-02-01 RX ADMIN — EPHEDRINE SULFATE 10 MG: 50 INJECTION INTRAMUSCULAR; INTRAVENOUS; SUBCUTANEOUS at 09:47

## 2022-02-01 RX ADMIN — DOCUSATE SODIUM 100 MG: 100 CAPSULE, LIQUID FILLED ORAL at 22:00

## 2022-02-01 RX ADMIN — PHENYLEPHRINE HYDROCHLORIDE 200 MCG: 10 INJECTION INTRAVENOUS at 17:18

## 2022-02-01 RX ADMIN — ROCURONIUM BROMIDE 20 MG: 10 INJECTION, SOLUTION INTRAVENOUS at 18:03

## 2022-02-01 RX ADMIN — ROCURONIUM BROMIDE 20 MG: 10 INJECTION, SOLUTION INTRAVENOUS at 18:30

## 2022-02-01 RX ADMIN — CEFAZOLIN 2 G: 330 INJECTION, POWDER, FOR SOLUTION INTRAMUSCULAR; INTRAVENOUS at 17:12

## 2022-02-01 RX ADMIN — LIDOCAINE HYDROCHLORIDE 4 ML: 40 SOLUTION TOPICAL at 09:16

## 2022-02-01 RX ADMIN — EPHEDRINE SULFATE 10 MG: 50 INJECTION INTRAMUSCULAR; INTRAVENOUS; SUBCUTANEOUS at 17:50

## 2022-02-01 RX ADMIN — MIDAZOLAM HYDROCHLORIDE 2 MG: 1 INJECTION, SOLUTION INTRAMUSCULAR; INTRAVENOUS at 09:00

## 2022-02-01 RX ADMIN — ONDANSETRON 4 MG: 2 INJECTION INTRAMUSCULAR; INTRAVENOUS at 10:46

## 2022-02-01 RX ADMIN — FENTANYL CITRATE 50 MCG: 50 INJECTION, SOLUTION INTRAMUSCULAR; INTRAVENOUS at 17:06

## 2022-02-01 RX ADMIN — OXYCODONE HYDROCHLORIDE 10 MG: 5 SOLUTION ORAL at 20:11

## 2022-02-01 RX ADMIN — IOHEXOL 80 ML: 350 INJECTION, SOLUTION INTRAVENOUS at 12:21

## 2022-02-01 RX ADMIN — CEFAZOLIN 2 G: 330 INJECTION, POWDER, FOR SOLUTION INTRAMUSCULAR; INTRAVENOUS at 09:17

## 2022-02-01 RX ADMIN — EPHEDRINE SULFATE 5 MG: 50 INJECTION INTRAMUSCULAR; INTRAVENOUS; SUBCUTANEOUS at 17:40

## 2022-02-01 RX ADMIN — TRANEXAMIC ACID 1000 MG: 100 INJECTION, SOLUTION INTRAVENOUS at 09:24

## 2022-02-01 RX ADMIN — ROCURONIUM BROMIDE 10 MG: 10 INJECTION, SOLUTION INTRAVENOUS at 18:57

## 2022-02-01 RX ADMIN — SODIUM CHLORIDE, POTASSIUM CHLORIDE, SODIUM LACTATE AND CALCIUM CHLORIDE: 600; 310; 30; 20 INJECTION, SOLUTION INTRAVENOUS at 17:03

## 2022-02-01 RX ADMIN — EPHEDRINE SULFATE 5 MG: 50 INJECTION INTRAMUSCULAR; INTRAVENOUS; SUBCUTANEOUS at 17:24

## 2022-02-01 RX ADMIN — FENTANYL CITRATE 50 MCG: 50 INJECTION, SOLUTION INTRAMUSCULAR; INTRAVENOUS at 19:25

## 2022-02-01 RX ADMIN — SODIUM CHLORIDE, SODIUM GLUCONATE, SODIUM ACETATE, POTASSIUM CHLORIDE AND MAGNESIUM CHLORIDE: 526; 502; 368; 37; 30 INJECTION, SOLUTION INTRAVENOUS at 10:39

## 2022-02-01 RX ADMIN — FENTANYL CITRATE 50 MCG: 50 INJECTION, SOLUTION INTRAMUSCULAR; INTRAVENOUS at 09:00

## 2022-02-01 RX ADMIN — PHENYLEPHRINE HYDROCHLORIDE 100 MCG: 10 INJECTION INTRAVENOUS at 17:21

## 2022-02-01 RX ADMIN — HEPARIN SODIUM 500 UNITS/HR: 5000 INJECTION, SOLUTION INTRAVENOUS at 17:03

## 2022-02-01 RX ADMIN — PHENYLEPHRINE HYDROCHLORIDE 200 MCG: 10 INJECTION INTRAVENOUS at 17:55

## 2022-02-01 ASSESSMENT — COGNITIVE AND FUNCTIONAL STATUS - GENERAL
DRESSING REGULAR LOWER BODY CLOTHING: A LITTLE
DRESSING REGULAR UPPER BODY CLOTHING: A LITTLE
HELP NEEDED FOR BATHING: A LITTLE
SUGGESTED CMS G CODE MODIFIER MOBILITY: CJ
DAILY ACTIVITIY SCORE: 21
MOBILITY SCORE: 20
CLIMB 3 TO 5 STEPS WITH RAILING: A LITTLE
STANDING UP FROM CHAIR USING ARMS: A LITTLE
TURNING FROM BACK TO SIDE WHILE IN FLAT BAD: A LITTLE
WALKING IN HOSPITAL ROOM: A LITTLE
SUGGESTED CMS G CODE MODIFIER DAILY ACTIVITY: CJ

## 2022-02-01 ASSESSMENT — LIFESTYLE VARIABLES
TOTAL SCORE: 0
EVER HAD A DRINK FIRST THING IN THE MORNING TO STEADY YOUR NERVES TO GET RID OF A HANGOVER: NO
HAVE PEOPLE ANNOYED YOU BY CRITICIZING YOUR DRINKING: NO
CONSUMPTION TOTAL: NEGATIVE
TOTAL SCORE: 0
HAVE YOU EVER FELT YOU SHOULD CUT DOWN ON YOUR DRINKING: NO
TOTAL SCORE: 0
AVERAGE NUMBER OF DAYS PER WEEK YOU HAVE A DRINK CONTAINING ALCOHOL: 0
HOW MANY TIMES IN THE PAST YEAR HAVE YOU HAD 5 OR MORE DRINKS IN A DAY: 0
EVER FELT BAD OR GUILTY ABOUT YOUR DRINKING: NO
ALCOHOL_USE: NO
ON A TYPICAL DAY WHEN YOU DRINK ALCOHOL HOW MANY DRINKS DO YOU HAVE: 0

## 2022-02-01 ASSESSMENT — PATIENT HEALTH QUESTIONNAIRE - PHQ9
1. LITTLE INTEREST OR PLEASURE IN DOING THINGS: NOT AT ALL
2. FEELING DOWN, DEPRESSED, IRRITABLE, OR HOPELESS: NOT AT ALL
SUM OF ALL RESPONSES TO PHQ9 QUESTIONS 1 AND 2: 0

## 2022-02-01 ASSESSMENT — PAIN DESCRIPTION - PAIN TYPE
TYPE: ACUTE PAIN;SURGICAL PAIN

## 2022-02-01 NOTE — ANESTHESIA TIME REPORT
Anesthesia Start and Stop Event Times     Date Time Event    2/1/2022 0851 Ready for Procedure     0913 Anesthesia Start     1126 Anesthesia Stop        Responsible Staff  02/01/22    Name Role Begin End    Alek Man M.D. Anesth 0913 1126        Preop Diagnosis (Free Text):  Pre-op Diagnosis     OSTEOARTHRITIS OF SHOULDER LEFT        Preop Diagnosis (Codes):    Premium Reason  Non-Premium    Comments:

## 2022-02-01 NOTE — OR NURSING
1123- Patient arrived from OR post ARTHROPLASTY, SHOULDER, TOTAL - AND REPAIRS AS INDICATED - Left. Patient arouses easily to voice. Respirations spontaneous and unlabored. VSS, see flowsheets. Surgical dressing to L shoulder C/D/I with immobilizer in place. Fingers warm and pink with brisk cap refill. No palpable L radial or brachial pulses.   1128- Dr. Hess and Dr. Man assessing CMS.   1154- Patient to CT with RN.   1226- Return to PACU from CT. No changes to surgical assessment.  1241- Dr. Paz at bedside.   1256- Dr. Paz discussing imaging and findings with patient and . Plan to transfer to [facility] and return to OR.   1326- No changes to surgical assessment.   1341- Report to RIVER Levy  1344- Report to Brenda Pre-op RIVER Berg  1423- Report received back from RIVER Levy. No changes to surgical assessment.   1438- IS and education provided. Patient demonstrates correctly, pulling only 1000 mL at this time.   1450- Update provided to Brenda Berg-Pre-op RN.   1515- No changes to surgical assessment.   1533- Report to ORION to transfer patient to [facility].

## 2022-02-01 NOTE — CONSULTS
ACUTE CARE VASCULAR SERVICE  CONSULT NOTE      Date: 2/1/2022    Referring Provider: Tsering Hess M.d.    Consulting Physician: Darryl Paz M.D. [facility]    -------------------------------------------------------------------------------------------------    Reason for consultation:  Left axillary artery occlusion    HPI:  This is a 56 y.o. female who underwent elective left shoulder surgery today.  Intraoperatively there was some bleeding which was controlled with clips.  Postoperatively they noticed a pulse deficit.  Patient underwent CT angiogram which showed a segmental occlusion of the left axillary artery near the junction with the brachial artery.  No evidence of bleeding.  The patient is currently alert and conversant in no distress.  The left radial pulse is nonpalpable.  There is a pulse oximeter on the left index finger with an oxygen saturation of 96% and a moderate waveform.  Motor and sensory exam is limited due to the block that was given intraoperatively.     Past Medical History:   Diagnosis Date   • Anesthesia     slow to wake up   • Arrhythmia     palpitations   • Delayed emergence from general anesthesia    • Hypertension    • Infectious disease     scarlet fever age 25   • Pain     left shoulder   • PONV (postoperative nausea and vomiting)     cervical epidural 2021       Past Surgical History:   Procedure Laterality Date   • CERVICAL DISK AND FUSION ANTERIOR  3/29/2013    Performed by Matt Garcia M.D. at [facility] ORS   • HYSTERECTOMY, VAGINAL         Current Facility-Administered Medications   Medication Dose Route Frequency Provider Last Rate Last Admin   • lactated ringers infusion   Intravenous Continuous Alek Man M.D.       • fentaNYL (SUBLIMAZE) injection 25 mcg  25 mcg Intravenous Q2 MIN PRN Alek Man M.D.        Or   • fentaNYL (SUBLIMAZE) injection 50 mcg  50 mcg Intravenous Q2 MIN PRN Alek Man M.D.       •  HYDROmorphone (Dilaudid) injection 0.1 mg  0.1 mg Intravenous Q5 MIN PRN Alek Man M.D.        Or   • HYDROmorphone (Dilaudid) injection 0.2 mg  0.2 mg Intravenous Q5 MIN PRN Alek Man M.D.        Or   • HYDROmorphone (Dilaudid) injection 0.4 mg  0.4 mg Intravenous Q5 MIN PRN Alek Man M.D.       • oxyCODONE (ROXICODONE) oral solution 5 mg  5 mg Oral Once PRN Alek Man M.D.        Or   • oxyCODONE (ROXICODONE) oral solution 10 mg  10 mg Oral Once PRN Alek Man M.D.       • meperidine (DEMEROL) injection 6.25 mg  6.25 mg Intravenous Q5 MIN PRN Alek Man M.D.       • ondansetron (ZOFRAN) syringe/vial injection 4 mg  4 mg Intravenous Once PRN Alek Man M.D.       • haloperidol lactate (HALDOL) injection 1 mg  1 mg Intravenous Q15 MIN PRN Alek Man M.D.       • diphenhydrAMINE (BENADRYL) injection 12.5 mg  12.5 mg Intravenous Q15 MIN PRN Alek Man M.D.       • albuterol (PROVENTIL) 2.5mg/0.5ml nebulizer solution 2.5 mg  2.5 mg Inhalation Q10 MIN PRN Alek Man M.D.       • ePHEDrine injection 5 mg  5 mg Intravenous Q5 MIN PRN Alek Man M.D.       • labetalol (NORMODYNE/TRANDATE) injection 5 mg  5 mg Intravenous Q HOUR PRN Alek Man M.D.       • hydrALAZINE (APRESOLINE) injection 5 mg  5 mg Intravenous Q5 MIN PRN Alek Man M.D.           Social History     Socioeconomic History   • Marital status:      Spouse name: Not on file   • Number of children: Not on file   • Years of education: Not on file   • Highest education level: Not on file   Occupational History   • Not on file   Tobacco Use   • Smoking status: Never Smoker   • Smokeless tobacco: Never Used   Vaping Use   • Vaping Use: Never used   Substance and Sexual Activity   • Alcohol use: Yes     Comment: less than a glass wine a week   • Drug use: No   • Sexual activity: Not on file   Other Topics Concern   • Not on file   Social History Narrative   • Not on  file     Social Determinants of Health     Financial Resource Strain:    • Difficulty of Paying Living Expenses: Not on file   Food Insecurity:    • Worried About Running Out of Food in the Last Year: Not on file   • Ran Out of Food in the Last Year: Not on file   Transportation Needs:    • Lack of Transportation (Medical): Not on file   • Lack of Transportation (Non-Medical): Not on file   Physical Activity:    • Days of Exercise per Week: Not on file   • Minutes of Exercise per Session: Not on file   Stress:    • Feeling of Stress : Not on file   Social Connections:    • Frequency of Communication with Friends and Family: Not on file   • Frequency of Social Gatherings with Friends and Family: Not on file   • Attends Evangelical Services: Not on file   • Active Member of Clubs or Organizations: Not on file   • Attends Club or Organization Meetings: Not on file   • Marital Status: Not on file   Intimate Partner Violence:    • Fear of Current or Ex-Partner: Not on file   • Emotionally Abused: Not on file   • Physically Abused: Not on file   • Sexually Abused: Not on file   Housing Stability:    • Unable to Pay for Housing in the Last Year: Not on file   • Number of Places Lived in the Last Year: Not on file   • Unstable Housing in the Last Year: Not on file       History reviewed. No pertinent family history.    Allergies:  Pcn [penicillins], Sulfa drugs, and Tape    Review of Systems:  Constitutional: Negative for fever, chills, weight loss,   HENT:   Negative for hearing loss or tinnitus    Eyes:    Negative for blurred vision, double vision, or loss of vision  Respiratory:  Negative for cough, hemoptysis, or wheezing    Cardiac:  Negative for chest pain or palpitations or orthopnea  Vascular:  Negative for claudication or rest pain   Gastrointestinal: Negative for nausea, vomiting, or abdominal pain     Negative for hematochezia or melena   Genitourinary: Negative for dysuria, frequency, or hematuria  "  Musculoskeletal: Negative for myalgias, back pain, or joint pain  Skin:   Negative for itching or rash  Neurological:  Negative for dizziness, headaches, or tremors     Negative for speech disturbance     Negative for extremity weakness or paresthesias except the left arm which had a regional block for surgery  Endo/Heme:  Negative for easy bruising or bleeding  Psychiatric:  Negative for depression, suicidal ideas, or hallucinations    Physical Exam:  /66   Pulse 89   Temp 36 °C (96.8 °F) (Temporal)   Resp 20   Ht 1.626 m (5' 4\")   Wt 81.8 kg (180 lb 5.4 oz)   SpO2 99%     Constitutional: Alert, oriented, no acute distress  HEENT:  Normocephalic and atraumatic, EOMI  Neck:   Supple, no JVD,   Cardiovascular: Regular rate and rhythm,   Pulmonary:  Good air entry bilaterally,    Abdominal:  Soft, non-tender, non-distended     Aortic impulse not widened  Musculoskeletal: No edema, no tenderness  Neurological:  CN II-XII grossly intact, no focal deficits  Skin:   Skin is warm and dry. No rash noted.  Psychiatric:  Normal mood and affect.  Vascular:  Extremities warm and well perfused  The left radial pulse is nonpalpable.  There is a pulse oximeter on the left index finger with an oxygen saturation of 96% and a moderate waveform.  Motor and sensory exam is limited due to the regional block that was given intraoperatively.     Labs:  Labs from 1/18/2022 show white count 7.1 hemoglobin 15 platelets 254 electrolytes normal creatinine normal    Radiology:  CT angiogram which showed a segmental occlusion of the left axillary artery near the junction with the brachial artery.  No evidence of bleeding.    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  It would be best to perform this repair at [facility] and arrangements will be made to transfer the patient up there.  " Surgery will be this evening once the patient arrives.  Patient will be maintained on heparin infusion at 500 units an hour to prevent thrombosis of the upper extremity vessels    I had a detailed discussion with the patient and her  regarding the findings and the recommendation for surgical repair of the artery.  We discussed the steps of the operation and the expected recovery and we also discussed the risks.  Questions were answered and they agreed to proceed.    Draryl Paz MD  [facility]  Voalte preferred. Otherwise text to cell [phone] or call my office [phone]  __________________________________________________________________  Patient:Dylan Espino   MRN:[id number]   CSN:[id number]

## 2022-02-01 NOTE — ANESTHESIA PREPROCEDURE EVALUATION
Case: [id number] Date/Time: 02/01/22 0845    Procedure: ARTHROPLASTY, SHOULDER, TOTAL - AND REPAIRS AS INDICATED (Left Shoulder)    Anesthesia type: General    Pre-op diagnosis: OSTEOARTHRITIS OF SHOULDER LEFT    Location: [facility] OR 05 / SURGERY [facility]    Surgeons: Tsering Hess M.D.          Relevant Problems   No relevant active problems   PONV-scope patch preop  HTN  palpitations    Physical Exam    Airway   Mallampati: II  TM distance: >3 FB  Neck ROM: full       Cardiovascular - normal exam  Rhythm: regular  Rate: normal  (-) murmur     Dental - normal exam           Pulmonary - normal exam  Breath sounds clear to auscultation     Abdominal    Neurological - normal exam                 Anesthesia Plan    ASA 2       Plan - general and peripheral nerve block     Peripheral nerve block will be post-op pain control  Airway plan will be ETT          Induction: intravenous    Postoperative Plan: Postoperative administration of opioids is intended.    Pertinent diagnostic labs and testing reviewed    Informed Consent:    Anesthetic plan and risks discussed with patient.    Use of blood products discussed with: patient whom consented to blood products.

## 2022-02-01 NOTE — ANESTHESIA PROCEDURE NOTES
Peripheral Block    Date/Time: 2/1/2022 9:00 AM  Performed by: Alek Man M.D.  Authorized by: Alek Man M.D.     Patient Location:  Pre-op  Start Time:  2/1/2022 9:00 AM  End Time:  2/1/2022 9:04 AM  Reason for Block: at surgeon's request and post-op pain management ONLY    patient identified, IV checked, site marked, risks and benefits discussed, surgical consent, monitors and equipment checked, pre-op evaluation and timeout performed    Patient Position:  Supine  Prep: ChloraPrep    Monitoring:  Heart rate and continuous pulse ox  Block Region:  Upper Extremity  Upper Extremity - Block Type:  BRACHIAL PLEXUS block, Interscalene approach    Laterality:  Left  Procedures: ultrasound guided  Image captured, interpreted and electronically stored.  Local Infiltration:  Lidocaine  Strength:  2 %  Dose:  1 ml  Block Type:  Single-shot  Needle Length:  100mm  Needle Gauge:  21 G  Needle Localization:  Ultrasound guidance  Injection Assessment:  Negative aspiration for heme, no paresthesia on injection, incremental injection and local visualized surrounding nerve on ultrasound  Evidence of intravascular injection: No     US Guided Interscalene Brachial Plexus Block   US transducer placed on the neck in oblique plane approximately at the level of C6.  Anterior and Middle Scalene (MSM) muscles identified with nerve trunks identified between the muscles.  Needle inserted lateral to probe and advanced under direct visualization through the MSM into a perineural position.  After negative aspiration, LA injected with ease and visualized surrounding the nerve trunks. U/S picture in paper chart.

## 2022-02-01 NOTE — OR NURSING
1340- report received from Veronica HOLMAN.  Pt resting comfortably at this time.  L shoulder dressing cdi.    1350- Heparin drip started, verified with Veronica HOLMAN.  7684- Report back to Veronica HOLMAN.

## 2022-02-01 NOTE — ANESTHESIA POSTPROCEDURE EVALUATION
Patient: Dylan Espino    Procedure Summary     Date: 02/01/22 Room / Location:  OR  / SURGERY [facility]    Anesthesia Start: 0913 Anesthesia Stop: 1126    Procedure: ARTHROPLASTY, SHOULDER, TOTAL - AND REPAIRS AS INDICATED (Left Shoulder) Diagnosis: (OSTEOARTHRITIS OF SHOULDER LEFT)    Surgeons: Tsering Hess M.D. Responsible Provider: Alek Man M.D.    Anesthesia Type: general, peripheral nerve block ASA Status: 2          Final Anesthesia Type: general, peripheral nerve block  Last vitals  BP   Blood Pressure: 103/66    Temp   36 °C (96.8 °F)    Pulse   89   Resp   20    SpO2   99 %      Anesthesia Post Evaluation    Patient location during evaluation: PACU  Patient participation: complete - patient participated  Level of consciousness: awake and alert    Airway patency: patent  Anesthetic complications: no  Cardiovascular status: hemodynamically stable  Respiratory status: acceptable  Hydration status: euvolemic  Comments: Patient with axillary artery occlusion after TSA.  She is going to be transferred to the [facility] for wound exploration and repair.  No complications from anesthesia.     PONV: none    patient able to participate, but full recovery from regional anesthesia has not occurred and is not expected within the stipulated timeframe for the completion of the evaluation      No complications documented.     Nurse Pain Score: 0 (NPRS)

## 2022-02-01 NOTE — ANESTHESIA PROCEDURE NOTES
Airway    Date/Time: 2/1/2022 9:17 AM  Performed by: Alek Man M.D.  Authorized by: Alek Man M.D.     Location:  OR  Urgency:  Elective  Difficult Airway: No    Indications for Airway Management:  Anesthesia      Spontaneous Ventilation: absent    Sedation Level:  Deep  Preoxygenated: Yes    Patient Position:  Sniffing  Mask Difficulty Assessment:  2 - vent by mask + OA or adjuvant +/- NMBA  Final Airway Type:  Endotracheal airway  Final Endotracheal Airway:  ETT  Cuffed: Yes    Technique Used for Successful ETT Placement:  Direct laryngoscopy    Insertion Site:  Oral  Blade Type:  Wilder  Laryngoscope Blade/Videolaryngoscope Blade Size:  3  ETT Size (mm):  7.0  Measured from:  Teeth  ETT to Teeth (cm):  21  Placement Verified by: auscultation and capnometry    Cormack-Lehane Classification:  Grade I - full view of glottis  Number of Attempts at Approach:  1   Atraumatic DLx1

## 2022-02-01 NOTE — OR NURSING
Procedure, patient allergies, and NPO status verified.  Home med rec completed and belongings secured. Patient verbalizes understanding of pain scale, expected course of stay and plan of care.  Surgical site verified with pt. IV access established.  SCD placed on both legs.

## 2022-02-02 VITALS
RESPIRATION RATE: 16 BRPM | HEART RATE: 87 BPM | OXYGEN SATURATION: 95 % | TEMPERATURE: 97.5 F | SYSTOLIC BLOOD PRESSURE: 91 MMHG | DIASTOLIC BLOOD PRESSURE: 54 MMHG

## 2022-02-02 PROBLEM — S45.002A: Status: RESOLVED | Noted: 2022-02-01 | Resolved: 2022-02-02

## 2022-02-02 LAB
ANION GAP SERPL CALC-SCNC: 9 MMOL/L (ref 7–16)
BUN SERPL-MCNC: 18 MG/DL (ref 8–22)
CALCIUM SERPL-MCNC: 8.3 MG/DL (ref 8.5–10.5)
CHLORIDE SERPL-SCNC: 106 MMOL/L (ref 96–112)
CO2 SERPL-SCNC: 22 MMOL/L (ref 20–33)
CREAT SERPL-MCNC: 0.67 MG/DL (ref 0.5–1.4)
ERYTHROCYTE [DISTWIDTH] IN BLOOD BY AUTOMATED COUNT: 42.3 FL (ref 35.9–50)
GLUCOSE SERPL-MCNC: 148 MG/DL (ref 65–99)
HCT VFR BLD AUTO: 30.7 % (ref 37–47)
HGB BLD-MCNC: 10 G/DL (ref 12–16)
MCH RBC QN AUTO: 30.3 PG (ref 27–33)
MCHC RBC AUTO-ENTMCNC: 32.6 G/DL (ref 33.6–35)
MCV RBC AUTO: 93 FL (ref 81.4–97.8)
PLATELET # BLD AUTO: 222 K/UL (ref 164–446)
PMV BLD AUTO: 8.8 FL (ref 9–12.9)
POTASSIUM SERPL-SCNC: 4.4 MMOL/L (ref 3.6–5.5)
RBC # BLD AUTO: 3.3 M/UL (ref 4.2–5.4)
SODIUM SERPL-SCNC: 137 MMOL/L (ref 135–145)
WBC # BLD AUTO: 12.3 K/UL (ref 4.8–10.8)

## 2022-02-02 PROCEDURE — 700111 HCHG RX REV CODE 636 W/ 250 OVERRIDE (IP): Performed by: SURGERY

## 2022-02-02 PROCEDURE — 90471 IMMUNIZATION ADMIN: CPT

## 2022-02-02 PROCEDURE — 3E02340 INTRODUCTION OF INFLUENZA VACCINE INTO MUSCLE, PERCUTANEOUS APPROACH: ICD-10-PCS | Performed by: SURGERY

## 2022-02-02 PROCEDURE — 85027 COMPLETE CBC AUTOMATED: CPT

## 2022-02-02 PROCEDURE — 36415 COLL VENOUS BLD VENIPUNCTURE: CPT

## 2022-02-02 PROCEDURE — 80048 BASIC METABOLIC PNL TOTAL CA: CPT

## 2022-02-02 PROCEDURE — 90686 IIV4 VACC NO PRSV 0.5 ML IM: CPT | Performed by: SURGERY

## 2022-02-02 PROCEDURE — 700102 HCHG RX REV CODE 250 W/ 637 OVERRIDE(OP): Performed by: SURGERY

## 2022-02-02 PROCEDURE — 03Q60ZZ REPAIR LEFT AXILLARY ARTERY, OPEN APPROACH: ICD-10-PCS | Performed by: SURGERY

## 2022-02-02 PROCEDURE — A9270 NON-COVERED ITEM OR SERVICE: HCPCS | Performed by: SURGERY

## 2022-02-02 RX ADMIN — KETOROLAC TROMETHAMINE 30 MG: 30 INJECTION, SOLUTION INTRAMUSCULAR; INTRAVENOUS at 05:17

## 2022-02-02 RX ADMIN — DOCUSATE SODIUM 100 MG: 100 CAPSULE, LIQUID FILLED ORAL at 05:18

## 2022-02-02 RX ADMIN — ASPIRIN 325 MG ORAL TABLET 325 MG: 325 PILL ORAL at 05:17

## 2022-02-02 RX ADMIN — ENOXAPARIN SODIUM 40 MG: 40 INJECTION SUBCUTANEOUS at 05:18

## 2022-02-02 RX ADMIN — FAMOTIDINE 20 MG: 20 TABLET ORAL at 05:18

## 2022-02-02 RX ADMIN — INFLUENZA A VIRUS A/VICTORIA/2570/2019 IVR-215 (H1N1) ANTIGEN (FORMALDEHYDE INACTIVATED), INFLUENZA A VIRUS A/TASMANIA/503/2020 IVR-221 (H3N2) ANTIGEN (FORMALDEHYDE INACTIVATED), INFLUENZA B VIRUS B/PHUKET/3073/2013 ANTIGEN (FORMALDEHYDE INACTIVATED), AND INFLUENZA B VIRUS B/WASHINGTON/02/2019 ANTIGEN (FORMALDEHYDE INACTIVATED) 0.5 ML: 15; 15; 15; 15 INJECTION, SUSPENSION INTRAMUSCULAR at 12:18

## 2022-02-02 RX ADMIN — ACETAMINOPHEN 1000 MG: 500 TABLET ORAL at 05:17

## 2022-02-02 RX ADMIN — KETOROLAC TROMETHAMINE 30 MG: 30 INJECTION, SOLUTION INTRAMUSCULAR; INTRAVENOUS at 00:38

## 2022-02-02 RX ADMIN — ACETAMINOPHEN 1000 MG: 500 TABLET ORAL at 00:38

## 2022-02-02 NOTE — PROGRESS NOTES
Discharging Patient home per physician order.  Discharged with spouse.  Demonstrated understanding of discharge instructions, follow up appointments, home medications, prescriptions, home care for surgical wound, and nursing care instructions for s/p shoulder replacement.  Ambulating without assistance, voiding without difficulty, pain well controlled, tolerating oral medications, oxygen saturation greater than 90% , tolerating diet. Educational handouts given and discussed.  Verbalized understanding of discharge instructions and educational handouts.  Stated several reasons why to return to ED or seek medical attention. All questions answered.  Belongings and dressing supplies with patient at time of discharge.

## 2022-02-02 NOTE — PROGRESS NOTES
ACUTE CARE VASCULAR SERVICE  PROGRESS NOTE      No acute changes  Pain controlled with regional block  Strong palpable left radial pulse    No specific concerns at this time  Discharge home today  Follow-up 1 week with vascular and orthopedic surgery    Darryl Paz MD  [facility]  Voal[employer] preferred. Otherwise text to cell [phone] or call my office [phone]  __________________________________________________________________  Patient:Dylan Cabralesarabella   MRN:[id number]   CSN:[id number]

## 2022-02-02 NOTE — ANESTHESIA TIME REPORT
Anesthesia Start and Stop Event Times     Date Time Event    2/1/2022 1646 Ready for Procedure     1703 Anesthesia Start     1935 Anesthesia Stop        Responsible Staff  02/01/22    Name Role Begin End    Lora Hicks M.D. 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 3PM - 7AM    Comments:

## 2022-02-02 NOTE — ANESTHESIA PREPROCEDURE EVALUATION
Case: [id number] Date/Time: 02/01/22 0845    Procedure: ARTHROPLASTY, SHOULDER, TOTAL - AND REPAIRS AS INDICATED (Left Shoulder)    Anesthesia type: General    Pre-op diagnosis: OSTEOARTHRITIS OF SHOULDER LEFT    Location: [facility] OR 05 / SURGERY [facility]    Surgeons: Tsering Hess M.D.        57 yo F POD #0 s/p left total shoulder arthroplasty complicated by axillary artery occlusion now with pulse deficit here for repair.  No current CP/SOB/N/V symptoms.    NPO   covid neg  Relevant Problems   Other   (positive) Shoulder arthritis   PONV-scope patch preop  HTN  palpitations    Physical Exam    Airway   Mallampati: II  TM distance: >3 FB  Neck ROM: full       Cardiovascular - normal exam  Rhythm: regular  Rate: normal  (-) murmur     Dental - normal exam           Pulmonary - normal exam  Breath sounds clear to auscultation     Abdominal    Neurological - normal exam                 Anesthesia Plan    ASA 2- EMERGENT   ASA physical status emergent criteria: acute ischemia (limb, body part, tissue)    Plan - general       Airway plan will be ETT          Induction: intravenous    Postoperative Plan: Postoperative administration of opioids is intended.    Pertinent diagnostic labs and testing reviewed    Informed Consent:    Anesthetic plan and risks discussed with patient.    Use of blood products discussed with: patient whom consented to blood products.

## 2022-02-02 NOTE — PROGRESS NOTES
Bedside report received.  Assessment complete.  A&O x 4. Patient calls appropriately.  Patient ambulates with SB assist.    Patient has 0/10 pain. Pain managed with prescribed medications.  Denies N&V. NPO at this time  Left shoulder with silver mepilex, small amount of old drainage present. Left radial pulse palpable.   - void, - flatus, - BM.  Patient denies SOB.  SCD's off.    Review plan with of care with patient. Call light and personal belongings within reach. Hourly rounding in place. All needs met at this time.

## 2022-02-02 NOTE — OR NURSING
1933 Pt arrived to PACU with Anesthesiologist and OR RN. AAOx4. Even, unlabored respirations. VSS.  Denies pain. Denies nausea. Left shoulder dressing CDI.     2000 POC update given to Daniele, spouse, over the phone. All questions answered.     2045 Pt meets floor criteria. Report called to RIVER Braden on GSU.     2130 Pt transported to [facility] with RN. Chart and belongings with patient.

## 2022-02-02 NOTE — PROGRESS NOTES
"Orthopedic Progress Note  Subjective:  Doing well this morning.  Pain controlled. Resting in recliner.   Date of Surgery: 2/1/2022  Post-op Day: 1 Day Post-Op    Exam:  /58   Pulse (!) 106   Temp 36 °C (96.8 °F) (Temporal)   Resp (!) 22   Ht 1.626 m (5' 4\")   Wt 81.8 kg (180 lb 5.4 oz)   SpO2 96%   BMI 30.95 kg/m²     LEFT UE - Dsg dci, sling in place.  2+ radial pulse, hand warm.    Ins/Outs:  Intake/Output                       02/01/22 0700 - 02/02/22 0659 (Discharged) 02/02/22 0700 - 02/03/22 0659     [id number] [id number] Total [id number] [id number] Total                 Intake    I.V.  2600  -- 2600  --  -- --    Volume (mL) (electrolyte-A (PLASMALYTE-A) infusion) 1600 -- 1600 -- -- --    Volume (mL) (lactated ringers infusion) 1000 -- 1000 -- -- --    Total Intake 2600 -- 2600 -- -- --       Output    Blood  600  -- 600  --  -- --    Est. Blood Loss 600 -- 600 -- -- --    Total Output 600 -- 600 -- -- --       Net I/O     2000 -- 2000 -- -- --          Intake/Output Summary (Last 24 hours) at 2/2/2022 0800  Last data filed at 2/1/2022 1125  Gross per 24 hour   Intake 2600 ml   Output 600 ml   Net 2000 ml     Labs:  Recent Labs     02/01/22  1725 02/02/22  0406   WBC 12.6* 12.3*   RBC 4.00* 3.30*   HEMOGLOBIN 12.2 10.0*   HEMATOCRIT 36.6* 30.7*   MCV 91.5 93.0   MCH 30.5 30.3   MCHC 33.3* 32.6*   RDW 40.9 42.3   PLATELETCT 251 222   MPV 8.9* 8.8*     Recent Labs     02/02/22  0406   SODIUM 137   POTASSIUM 4.4   CHLORIDE 106   CO2 22   GLUCOSE 148*   BUN 18   CREATININE 0.67   CALCIUM 8.3*       Medications:    Imaging:  Reviewed    Quality:  Date of Admission: 2/1/2022  Hospital day #0  Prophylactic anticoagulation: ECASA  Assessment and Plan:  Spoke with patient at length about yesterday's events.  Questions answered.  Spoke with Daniele, her  as well by telephone.  Dressing change instructions and ECASA plan per Dr. Paz  Sling adjusted.  Elbow/wrist/hand ROM allowed, otherwise sling at all " times.  F/U as scheduled 10-14 days postop, and plan to start PT at that time.  Hopeful d/c home today per Dr. Paz with whom I spoke last evening after surgery      Tsering Hess M.D.

## 2022-02-02 NOTE — ANESTHESIA PROCEDURE NOTES
Airway    Date/Time: 2/1/2022 5:11 PM  Performed by: Lora Hicks M.D.  Authorized by: Lora Hicks M.D.     Location:  OR  Urgency:  Elective  Difficult Airway: No    Indications for Airway Management:  Anesthesia      Spontaneous Ventilation: absent    Sedation Level:  Deep  Preoxygenated: Yes    Patient Position:  Sniffing  Mask Difficulty Assessment:  2 - vent by mask + OA or adjuvant +/- NMBA  Final Airway Type:  Endotracheal airway  Final Endotracheal Airway:  ETT  Cuffed: Yes    Technique Used for Successful ETT Placement:  Direct laryngoscopy    Insertion Site:  Oral  Blade Type:  Wilder  Laryngoscope Blade/Videolaryngoscope Blade Size:  3  ETT Size (mm):  7.0  Measured from:  Teeth  ETT to Teeth (cm):  21  Placement Verified by: auscultation and capnometry    Cormack-Lehane Classification:  Grade IIa - partial view of glottis  Number of Attempts at Approach:  1

## 2022-02-02 NOTE — CARE PLAN
The patient is Stable - Low risk of patient condition declining or worsening    Shift Goals  Clinical Goals: Pain control  Patient Goals: discharge  Family Goals: NA    Progress made toward(s) clinical / shift goals: Patient reporting no pain at this time    Patient is not progressing towards the following goals:      Problem: Knowledge Deficit - Standard  Goal: Patient and family/care givers will demonstrate understanding of plan of care, disease process/condition, diagnostic tests and medications  Outcome: Progressing     Problem: Pain - Standard  Goal: Alleviation of pain or a reduction in pain to the patient’s comfort goal  Outcome: Progressing

## 2022-02-02 NOTE — OP REPORT
ACUTE CARE VASCULAR SERVICE  OPERATIVE NOTE    Date of Service:          2/1/2022   Patient Name:             Dylan Espino  Patient MRN:              [id number]    --------------------------------------------------------------------------------------------------    Preoperative Diagnosis:  -Left axillary artery injury    Postoperative Diagnosis:  -Left axillary artery injury    Procedure:  -Exploration of left axillary artery and primary repair  -Vin catheter thrombectomy of the left subclavian, axillary, brachial arteries    -------------------------------------------------------------------------------------------------    Surgeon:                                 Darryl Paz MD    Assistant:   Rickie MARTE    Anesthesia:                             General endotracheal anesthesia    EBL:                                        75 cc    Blood Products:                      none    Heparin:                                  Systemically heparinized    Specimen:   none    Findings:   Partial advential tear of the the mid axillary artery with irregular borders, possibly due to a clamp.  Artery was debrided and repaired in an end-to-end fashion.  Strong palpable radial pulse and ulnar Doppler signal at the wrist at the conclusion of the case    Complications:                        none    Disposition:                             Tolerated well, sent to recovery in stable condition    Justification for use of Surgical First Assist:  An experienced first assistant was utilized during this operation due to the complexity of the operation.  My assistant participated with patient preparation for surgery, incision, surgical exposure including retraction, dissection, and ligation to isolate the target structures and preserve nearby structures, and closure of the field of dissection.  The presence of the expert assist increased the efficiency of the operation and decreased the risk of intraoperative surgical  complications.    -----------------------------------------------------------------------------------------------------    History:  Dylan Espino is a 56 y.o. female who underwent elective left shoulder surgery today.  There was bleeding during the operation which was controlled with standard orthopedic hemostats and then clips were applied to the circumflex branch and the bleeding was controlled.  The surgery was completed and the patient was sent to recovery and in recovery she developed a pulse deficit.  CT angiogram was performed showing occlusion of the mid segment of the axillary artery.  Patient was transferred to [facility] for vascular reconstruction.    Procedure Summary:  The patient was properly identified in the preoperative area, taken to the operating room, and placed in a supine position where general endotracheal anesthesia was administered.  Intravenous antibiotics were administered by the anesthesiologist in the correct time interval.  The patient was prepped and draped in the usual sterile fashion.  Surgical timeout was called to identify the correct patient, procedure, and equipment.  Everyone was in agreement.    The existing incision was reopened and it was extended about 2 cm proximally and distally.  We reopened the exposure all the way down to the neurovascular bundle.  We were able to dissect the axillary artery proximally and encircled it with a vessel loop to achieve proximal control.  Patient was systemically heparinized prior to the operation and additional boluses of heparin were given during the operation.  Once we had proximal control we then dissected out at the site of injury and we found a partial advential tear of the the mid axillary artery with irregular borders, possibly due to clamp application on the artery.  The injured segment was not bleeding as thrombus had occluded the injury site.  The artery was dissected out distally and clamped.  The proximal  and distal ends of the injury were debrided.  The arteries were swept proximally and distally with a Vin catheter and no significant debris was retrieved.  There was brisk pulsatile inflow and brisk backbleeding.  The artery was spatulated and an end-to-end anastomosis was performed using interrupted 5-0 Prolene sutures.  The repair was flushed and irrigated prior to completion and once complete it was pressurized and found to be hemostatic.  There was a strong palpable radial pulse immediately after the repair and there was a brisk ulnar Doppler signal at the wrist.  Topical hemostatic was applied.  The incision was closed with multiple layers of absorbable suture and then Steri-Strips and a dry sterile dressing were placed.    All sponge, instrument and needle counts were correct at the conclusion of the case.  Patient tolerated the well and was sent to recovery in stable condition.    Darryl Paz MD  General and Vascular Surgery  [facility]  [phone]

## 2022-02-02 NOTE — OR SURGEON
Immediate Post OP Note    PreOp Diagnosis: Left axillary artery injury      PostOp Diagnosis: Left axillary artery injury      Procedure(s):  OPEN REPAIR OF LEFT AXILLARY ARTERY - Wound Class: Clean    Primary end-to-end repair    Surgeon(s):  Darryl Paz M.D.    Anesthesiologist/Type of Anesthesia:  Anesthesiologist: Lora Hicks M.D./General    Surgical Staff:  Assistant: MELCHOR Perera  Circulator: Teagan Jacobsen R.N.  Relief Circulator: Carito Sen R.N.  Scrub Person: Monique Basurto  Count Davenport: Monique Lai R.N.    Specimens removed if any:  * No specimens in log *    Estimated Blood Loss: 75cc    Complications: none        2/1/2022 8:29 PM Darryl Paz M.D.

## 2022-02-02 NOTE — PROGRESS NOTES
4 Eyes Skin Assessment Completed by RIVER Braden and RIVER Esparza.    Head WDL  Ears WDL  Nose WDL  Mouth WDL  Neck WDL  Breast/Chest WDL  Shoulder Blades WDL  Spine WDL  (R) Arm/Elbow/Hand WDL  (L) Arm/Elbow/Hand Edema, Surgical arm  Abdomen WDL  Groin WDL  Scrotum/Coccyx/Buttocks WDL  (R) Leg WDL  (L) Leg WDL  (R) Heel/Foot/Toe WDL  (L) Heel/Foot/Toe WDL          Devices In Places Pulse Ox      Interventions In Place N/A    Possible Skin Injury No    Pictures Uploaded Into Epic N/A  Wound Consult Placed N/A  RN Wound Prevention Protocol Ordered No .   [Parents] : parents

## 2022-02-02 NOTE — DISCHARGE INSTRUCTIONS
Discharge Instructions    Discharged to home by car with relative. Discharged via wheelchair, hospital escort: Yes.  Special equipment needed: Not Applicable    Be sure to schedule a follow-up appointment with your primary care doctor or any specialists as instructed.     Discharge Plan:   Influenza Vaccine Indication: Indicated: 9 to 64 years of age    I understand that a diet low in cholesterol, fat, and sodium is recommended for good health. Unless I have been given specific instructions below for another diet, I accept this instruction as my diet prescription.       Special Instructions: None    · Is patient discharged on Warfarin / Coumadin?   No       Shoulder Joint Replacement, Care After  This sheet gives you information about how to care for yourself after your procedure. Your health care provider may also give you more specific instructions. If you have problems or questions, contact your health care provider.  What can I expect after the procedure?  After your procedure, it is common to have:  · A bruised and stiff shoulder.  · A bruised and stiff arm.  · Some pain.  Follow these instructions at home:  If you have a sling:    · Wear the sling as told by your health care provider. Remove it only as told by your health care provider.  · Loosen the sling if your fingers tingle, become numb, or turn cold and blue.  · Keep the sling clean.  · If the sling is not waterproof:  ? Do not let it get wet.  ? Cover it with a watertight covering when you take a bath or a shower.  Bathing  · Do not take baths, swim, or use a hot tub until your health care provider approves. Ask your health care provider if you can take showers. You may only be allowed to take sponge baths for bathing.  · If your sling is not waterproof, cover it with a watertight covering when you take a bath or a shower.  · Keep the bandage (dressing) dry until your health care provider says it can be removed.  Managing pain, stiffness, and  swelling  · If directed, put ice on the affected area.  ? If you have a removable sling, remove it as told by your health care provider.  ? Put ice in a plastic bag.  ? Place a towel between your skin and the bag.  ? Leave the ice on for 20 minutes, 2-3 times a day.  · Move your fingers often to avoid stiffness and to lessen swelling.  Driving  · Do not drive or use heavy machinery while taking prescription pain medicine.  · Do not drive for 2-4 weeks after surgery or as told by your health care provider.  Medicine  · Take over-the-counter and prescription medicines only as told by your health care provider.  · If you were prescribed an antibiotic medicine, use it as told by your health care provider. Do not stop using the antibiotic even if you start to feel better.  Incision care  · Follow instructions from your health care provider about how to take care of your incision area. Make sure you:  ? Wash your hands with soap and water before you change your bandage (dressing). If soap and water are not available, use hand .  ? Change your dressing as told by your health care provider.  ? Leave staples, stitches (sutures), skin glue, or adhesive strips in place. These skin closures may need to stay in place for 2 weeks or longer. If adhesive strip edges start to loosen and curl up, you may trim the loose edges. Do not remove adhesive strips completely unless your health care provider tells you to do that.  · If you have a tube to remove drainage, follow instructions from your health care provider about caring for it. Do not remove the drain tube or any dressings around the tube opening unless your health care provider approves.  · Check your incision area every day for signs of infection. Check for:  ? More redness, swelling, or pain.  ? More fluid or blood.  ? Warmth.  ? Pus or a bad smell.  Activity  · Do not use your arm to push yourself up in bed or from a chair. This requires too much muscle.  · Follow  lifting restrictions as told:  ? Do not lift anything that is heavier than a cup of coffee for the first 6 weeks after surgery, or as told by your health care provider.  ? Do not lift anything that is heavier than 10 lb (4.5 kg) for 6 months, or as told by your health care provider.  · Do exercises, including physical therapy, as told by your health care provider.  · Try not to overuse your shoulder. This includes repetitive pushing or pulling. Early overuse of the shoulder may result in later problems. (Overusing the shoulder is easy to do when your pain goes away for the first time.)  · Avoid overstretching your arm for 6 weeks after surgery, or as told by your health care provider.  · Avoid sitting for a long time without moving. Get up and move around one or more times every few hours.  · Ask for help with some activities. Your health care provider may be able to suggest a clinic or agency for this if you do not have home support.  · Do not participate in contact sports.  General instructions  · Keep all follow-up visits as told by your health care provider. This is important.  · Do not use any products that contain nicotine or tobacco, such as cigarettes and e-cigarettes. These can delay healing. If you need help quitting, ask your health care provider.  Contact a health care provider if:  · You develop a rash.  · You have a fever.  · You have more redness, swelling, or pain in the incision area.  · You have more fluid or blood coming from your incision area.  · You have more pain when moving your shoulder.  Get help right away if:  · Your incision area feels warm to the touch.  · You have pus or a bad smell coming from your incision area.  · The edges of the incision site break open after sutures have been removed.  · You have chest pain or shortness of breath.  Summary  · It is common to have pain and stiffness in your shoulder and arm after the procedure. Put ice on the affected area and take pain medicine  as told by your health care provider.  · Do not use your arm to push yourself up in bed or from a chair.  · Do exercises, including physical therapy, as told by your health care provider.  · Check your incision area daily. Call your health care provider if you see signs of infection.  This information is not intended to replace advice given to you by your health care provider. Make sure you discuss any questions you have with your health care provider.  Document Released: 07/07/2006 Document Revised: 04/10/2020 Document Reviewed: 10/02/2017  [employer] Patient Education © 2020 [employer]                                                                           Depression / Suicide Risk    As you are discharged from this [facility] facility, it is important to learn how to keep safe from harming yourself.    Recognize the warning signs:  · Abrupt changes in personality, positive or negative- including increase in energy   · Giving away possessions  · Change in eating patterns- significant weight changes-  positive or negative  · Change in sleeping patterns- unable to sleep or sleeping all the time   · Unwillingness or inability to communicate  · Depression  · Unusual sadness, discouragement and loneliness  · Talk of wanting to die  · Neglect of personal appearance   · Rebelliousness- reckless behavior  · Withdrawal from people/activities they love  · Confusion- inability to concentrate     If you or a loved one observes any of these behaviors or has concerns about self-harm, here's what you can do:  · Talk about it- your feelings and reasons for harming yourself  · Remove any means that you might use to hurt yourself (examples: pills, rope, extension cords, firearm)  · Get professional help from the community (Mental Health, Substance Abuse, psychological counseling)  · Do not be alone:Call your Safe Contact- someone whom you trust who will be there for you.  · Call your local CRISIS HOTLINE 854-7810 or  175-498-7378  · Call your local Children's Mobile Crisis Response Team [place] (474) 146-1862 or www.Szl.it.Intellinote  · Call the toll free National Suicide Prevention Hotlines   · National Suicide Prevention Lifeline 410-385-ONST (3266)  · National [employer] Line Network 800-SUICIDE (848-0208)

## 2022-02-02 NOTE — H&P
ACUTE CARE VASCULAR SERVICE  ADMISSION H&P        Date: 2/1/2022    Admitting Physician: Darryl Paz M.D. [facility]    -------------------------------------------------------------------------------------------------     Reason for admission:  Left axillary artery occlusion     HPI:  This is a 56 y.o. female who underwent elective left shoulder surgery today.  Intraoperatively there was some bleeding which was controlled with clamps and clips.  Postoperatively they noticed a pulse deficit.  Patient underwent CT angiogram which showed a segmental occlusion of the left axillary artery near the junction with the brachial artery.  No evidence of bleeding.  The patient is currently alert and conversant in no distress.  The left radial pulse is nonpalpable.  There is a pulse oximeter on the left index finger with an oxygen saturation of 96% and a moderate waveform.  Motor and sensory exam is limited due to the block that was given intraoperatively.      Past Medical History        Past Medical History:   Diagnosis Date   • Anesthesia       slow to wake up   • Arrhythmia       palpitations   • Delayed emergence from general anesthesia     • Hypertension     • Infectious disease       scarlet fever age 25   • Pain       left shoulder   • PONV (postoperative nausea and vomiting)       cervical epidural 2021            Past Surgical History         Past Surgical History:   Procedure Laterality Date   • CERVICAL DISK AND FUSION ANTERIOR   3/29/2013     Performed by Matt Garcia M.D. at [facility] ORS   • HYSTERECTOMY, VAGINAL                Current Medications             Current Facility-Administered Medications   Medication Dose Route Frequency Provider Last Rate Last Admin   • lactated ringers infusion   Intravenous Continuous Alek Man M.D.       • fentaNYL (SUBLIMAZE) injection 25 mcg  25 mcg Intravenous Q2 MIN PRN Alek Man M.D.         Or   • fentaNYL (SUBLIMAZE)  injection 50 mcg  50 mcg Intravenous Q2 MIN PRN Alek Man M.D.       • HYDROmorphone (Dilaudid) injection 0.1 mg  0.1 mg Intravenous Q5 MIN PRN Alek Man M.D.         Or   • HYDROmorphone (Dilaudid) injection 0.2 mg  0.2 mg Intravenous Q5 MIN PRN Alek Man M.D.         Or   • HYDROmorphone (Dilaudid) injection 0.4 mg  0.4 mg Intravenous Q5 MIN PRN Alek Man M.D.       • oxyCODONE (ROXICODONE) oral solution 5 mg  5 mg Oral Once PRN Alek Man M.D.         Or   • oxyCODONE (ROXICODONE) oral solution 10 mg  10 mg Oral Once PRN Alek Man M.D.       • meperidine (DEMEROL) injection 6.25 mg  6.25 mg Intravenous Q5 MIN PRN Alek Man M.D.       • ondansetron (ZOFRAN) syringe/vial injection 4 mg  4 mg Intravenous Once PRN Alek Man M.D.       • haloperidol lactate (HALDOL) injection 1 mg  1 mg Intravenous Q15 MIN PRN Alek Man M.D.       • diphenhydrAMINE (BENADRYL) injection 12.5 mg  12.5 mg Intravenous Q15 MIN PRN Alek Man M.D.       • albuterol (PROVENTIL) 2.5mg/0.5ml nebulizer solution 2.5 mg  2.5 mg Inhalation Q10 MIN PRN Alek Man M.D.       • ePHEDrine injection 5 mg  5 mg Intravenous Q5 MIN PRN Alek Man M.D.       • labetalol (NORMODYNE/TRANDATE) injection 5 mg  5 mg Intravenous Q HOUR PRN Alek Man M.D.       • hydrALAZINE (APRESOLINE) injection 5 mg  5 mg Intravenous Q5 MIN PRN Alek Man M.D.                Social History               Socioeconomic History   • Marital status:        Spouse name: Not on file   • Number of children: Not on file   • Years of education: Not on file   • Highest education level: Not on file   Occupational History   • Not on file   Tobacco Use   • Smoking status: Never Smoker   • Smokeless tobacco: Never Used   Vaping Use   • Vaping Use: Never used   Substance and Sexual Activity   • Alcohol use: Yes       Comment: less than a glass wine a week   • Drug use: No   •  Sexual activity: Not on file   Other Topics Concern   • Not on file   Social History Narrative   • Not on file      Social Determinants of Health          Financial Resource Strain:    • Difficulty of Paying Living Expenses: Not on file   Food Insecurity:    • Worried About Running Out of Food in the Last Year: Not on file   • Ran Out of Food in the Last Year: Not on file   Transportation Needs:    • Lack of Transportation (Medical): Not on file   • Lack of Transportation (Non-Medical): Not on file   Physical Activity:    • Days of Exercise per Week: Not on file   • Minutes of Exercise per Session: Not on file   Stress:    • Feeling of Stress : Not on file   Social Connections:    • Frequency of Communication with Friends and Family: Not on file   • Frequency of Social Gatherings with Friends and Family: Not on file   • Attends Church Services: Not on file   • Active Member of Clubs or Organizations: Not on file   • Attends Club or Organization Meetings: Not on file   • Marital Status: Not on file   Intimate Partner Violence:    • Fear of Current or Ex-Partner: Not on file   • Emotionally Abused: Not on file   • Physically Abused: Not on file   • Sexually Abused: Not on file   Housing Stability:    • Unable to Pay for Housing in the Last Year: Not on file   • Number of Places Lived in the Last Year: Not on file   • Unstable Housing in the Last Year: Not on file            Family History   History reviewed. No pertinent family history.        Allergies:  Pcn [penicillins], Sulfa drugs, and Tape     Review of Systems:  Constitutional:          Negative for fever, chills, weight loss,   HENT:                         Negative for hearing loss or tinnitus    Eyes:                           Negative for blurred vision, double vision, or loss of vision  Respiratory:               Negative for cough, hemoptysis, or wheezing    Cardiac:                      Negative for chest pain or palpitations or orthopnea  Vascular:    "                 Negative for claudication or rest pain   Gastrointestinal:       Negative for nausea, vomiting, or abdominal pain                                      Negative for hematochezia or melena   Genitourinary:           Negative for dysuria, frequency, or hematuria   Musculoskeletal:       Negative for myalgias, back pain, or joint pain  Skin:                           Negative for itching or rash  Neurological:             Negative for dizziness, headaches, or tremors                                      Negative for speech disturbance                                      Negative for extremity weakness or paresthesias except the left arm which had a regional block for surgery  Endo/Heme:               Negative for easy bruising or bleeding  Psychiatric:                Negative for depression, suicidal ideas, or hallucinations     Physical Exam:  /66   Pulse 89   Temp 36 °C (96.8 °F) (Temporal)   Resp 20   Ht 1.626 m (5' 4\")   Wt 81.8 kg (180 lb 5.4 oz)   SpO2 99%      Constitutional:          Alert, oriented, no acute distress  HEENT:                       Normocephalic and atraumatic, EOMI  Neck:                          Supple, no JVD,   Cardiovascular:         Regular rate and rhythm,   Pulmonary:                Good air entry bilaterally,    Abdominal:                Soft, non-tender, non-distended                                      Aortic impulse not widened  Musculoskeletal:       No edema, no tenderness  Neurological:             CN II-XII grossly intact, no focal deficits  Skin:                           Skin is warm and dry. No rash noted.  Psychiatric:                Normal mood and affect.  Vascular:                     Extremities warm and well perfused  The left radial pulse is nonpalpable.  There is a pulse oximeter on the left index finger with an oxygen saturation of 96% and a moderate waveform.  Motor and sensory exam is limited due to the regional block that was given " intraoperatively.      Labs:  Labs from 1/18/2022 show white count 7.1 hemoglobin 15 platelets 254 electrolytes normal creatinine normal     Radiology:  CT angiogram which showed a segmental occlusion of the left axillary artery near the junction with the brachial artery.  No evidence of bleeding.     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  It would be best to perform this repair at [facility] and arrangements will be made to transfer the patient up there.  Surgery will be this evening once the patient arrives.  Patient will be maintained on heparin infusion at 500 units an hour to prevent thrombosis of the upper extremity vessels     I had a detailed discussion with the patient and her  regarding the findings and the recommendation for surgical repair of the artery.  We discussed the steps of the operation and the expected recovery and we also discussed the risks.  Questions were answered and they agreed to proceed.    Darryl Paz MD  [facility]  Voalte preferred. Otherwise text to cell [phone] or call my office [phone]  __________________________________________________________________  Patient:Dylan Espino   MRN:[id number]   CSN:[id number]

## 2022-02-02 NOTE — OP REPORT
DATE OF SERVICE:  02/01/2022     PREOPERATIVE DIAGNOSIS:  Severe glenohumeral joint arthritis, left shoulder.     POSTOPERATIVE DIAGNOSIS:  Severe glenohumeral joint arthritis, left shoulder.     PROCEDURE:  Left primary total shoulder arthroplasty with proximal biceps   tenodesis.     SURGEON:  Tsering Hess MD     ASSISTANT:  Alek Yusuf CFA     ANESTHESIOLOGIST:  Alek Man MD     TYPE OF ANESTHESIA:  General, with preoperative interscalene nerve block using   Exparel.     IV FLUIDS:  2 liters crystalloid.     ESTIMATED BLOOD LOSS:  500 mL.     DRAINS:  None.     SPECIMENS:  None.     COMPLICATIONS:  Intraoperative bleeding, with subsequently diagnosed axillary   artery injury.     REASON FOR PROCEDURE:  The patient is a 56-year-old female with a longstanding   history of left shoulder pain.  Plain x-rays demonstrated severe glenohumeral   joint arthritis.  She failed to respond to non-operative treatment measures.    We decided to proceed with a left total shoulder arthroplasty.     OPERATION:  The patient was given a left interscalene nerve block by the   anesthesiologist before surgery.  Once back in the operating room, a breathing   tube was placed.  She was given 2 grams of IV Ancef as well as 1 gram of   tranexamic acid.  After the sterile prep and drape, the left upper extremity   was placed in the spider articulating arm gallegos.  A PlasmaBlade was used to   make a deltopectoral skin incision.  The cephalic vein was identified and   retracted laterally.  A Bankart retractor was placed.  The biceps tendon was   palpated in the groove and would later be released and tenodesed at the time   of the closure.  The subscapularis tendon was identified.  Using a peel-off   technique, the upper corner was tagged with an XBraid suture and with   progressive external rotation, access was gained to the arthritic proximal   humeral articular cartilage.  Retractors were carefully placed around the   humeral  head.  The osteophytes were removed peripherally with a rongeur.  A   cutting guide was brought up and used to allow for the cut to be made in   appropriate inclination as well as version, as had been templated on the   patient's preoperative blueprint CT scan.  After the cut was made, the cut   humeral head surface was removed.  Immediately upon making this humeral neck   cut, there was some significant bleeding, which was felt to be arterial coming   from the posterior aspect of the shoulder.  Retractors were carefully placed   and the shoulder was positioned so that access could be obtained to the   bleeding.  The PlasmaBlade was used around the proximal humeral neck.  This   was not felt to be one of the three-sister vascular structures, but rather   likely the posterior circumflex artery, which is typically not encountered at   the time of the humeral neck cut.  Control was gained and vascular clips were   placed to control bleeding in this location.  A sterile Doppler was brought up   to use, and the signal was heard when the deep axillary artery was evaluated   using the sterile Doppler.  Thus, we decided to proceed with the remainder of   the operation and no further bleeding was encountered.  The proximal humerus   was prepared for a Simpliciti implant.  The size 1 guide was brought up and   the pin was drilled through the far cortex.  The reamer was used followed by   the larger drill and finally the trial Simpliciti #1 nucleus was tapped into   place.  This was done with a cut protector.  Retractors were then placed   circumferentially around the glenoid.  As had been templated, a small size 40   glenoid was used.  The central hole was marked.  The drill was used.  The   small 40 reamer was then used to remove the superficial cartilage.  The   opening larger drill was used.  The 3 peripheral peg holes were drilled.  The   wound was irrigated copiously with dilute Betadine as well as sterile saline.     Thrombin-soaked Gelfoam was then placed in all holes.  A single batch of   Simplex cement with antibiotic was mixed and placed into a 10 mL syringe.    Once this thrombin-soaked Gelfoam was removed, all holes were filled with   cement.  A small 40 CortiLoc glenoid was tapped into place and impacted and   held with direct thumb pressure until the cement had thoroughly dried.  The   area where bleeding had been noted was further evaluated with no oozing or   bleeding noted at this point.  An additional thrombin-soaked Gelfoam was   placed in this region to help control any potential issues.  Next, attention   was turned to the proximal humerus.  Trial heads were used with a size 41   central head noting to have the best overall fit for this patient.  This   allowed for excellent stability as well as rotator cuff tension.  The trial   humeral components were removed.  A single Iconix anchor was drilled and   tapped into place for subscapularis tendon repair.  After copious irrigation,   the size 1 nucleus was impacted, followed by the 41 central head.  The   shoulder was reduced and again stability was checked.  The subscapularis   tendon was then closed with multiple modified Soren-Fidencio sutures from the 3   individual #2 sutures from the anchor.  An additional XBraid was used to close   the rotator interval as well as oversew the subscapularis repair.  The biceps   tendon had been previously released at the time of the glenoid preparation.    It was then tenodesed to the lateral aspect of the subscapularis tendon with   an additional XBraid.  The wound was further irrigated.  A single gram of   vancomycin powder was placed in the deep and superficial wound.  The   deltopectoral interval was closed with 0 Vicryl.  A running 2-0 Monocryl was   used in the subcutaneous tissue with a running 3-0 Monocryl in the   subcuticular layer.  Benzoin and Steri-Strips were placed.  At the conclusion   of the case, the Doppler was  used with a very faint radial signal noted.  A   brachial signal could not be obtained.  All drapes were removed and the arm   was carefully placed into a shoulder abduction sling.  The patient was placed   supine on a stretcher and taken to recovery room, in stable condition.  With a   weak radial signal, although warm hand, I elected to call the vascular surgeon   on call, Dr. Darryl Paz.  He advised me to place an order for a stat CT   angiogram of the left upper extremity.  This study was performed on a stat   basis, showing some collateral flow, but occlusion of the axillary artery.    Dr. Paz was made aware, and the decision was made to transfer the patient   to [facility] for further care, to involve operative exploration by   Dr. Paz.  I explained the situation to the patient as well as her .    I will follow her closely.        ______________________________  MD CHIN MCMAHAN/LUCIAN    DD:  02/01/2022 16:01  DT:  02/01/2022 17:05    Job#:  [id number]

## 2022-02-02 NOTE — CARE PLAN
The patient is Stable - Low risk of patient condition declining or worsening    Shift Goals  Clinical Goals: Pain control  Patient Goals: Pain control  Family Goals: NA    Progress made toward(s) clinical / shift goals:  Patients pain controlled overnight. Scheduled tylenol and ketorolac were given. Pt ambulated SBA in room once overnight. L arm dx is clean, dry, and intact. Pulses present. [place] will be removed POD #1.    Problem: Knowledge Deficit - Standard  Goal: Patient and family/care givers will demonstrate understanding of plan of care, disease process/condition, diagnostic tests and medications  Outcome: Progressing     Patient is not progressing towards the following goals: NA

## 2022-07-11 ENCOUNTER — HOSPITAL ENCOUNTER (OUTPATIENT)
Dept: RADIOLOGY | Facility: MEDICAL CENTER | Age: 57
End: 2022-07-11
Attending: SURGERY
Payer: COMMERCIAL

## 2022-07-11 DIAGNOSIS — S45.002A INJURY OF LEFT AXILLARY ARTERY, INITIAL ENCOUNTER: ICD-10-CM

## 2022-07-11 PROCEDURE — 93931 UPPER EXTREMITY STUDY: CPT | Mod: 26,LT,GZ | Performed by: INTERNAL MEDICINE

## 2022-07-11 PROCEDURE — 93931 UPPER EXTREMITY STUDY: CPT | Mod: LT

## (undated) DEVICE — HEAD HOLDER JUNIOR/ADULT

## (undated) DEVICE — SURGIFOAM (SIZE 100) - (6EA/CA)

## (undated) DEVICE — TOWEL STOP TIMEOUT SAFETY FLAG (40EA/CA)

## (undated) DEVICE — SLEEVE, VASO, THIGH, MED

## (undated) DEVICE — SUTURE 6-0 PROLENE BV-1 D/A 24 (36PK/BX)"

## (undated) DEVICE — GOWN SURGEONS X-LARGE - DISP. (30/CA)

## (undated) DEVICE — DRAPE SURG STERI-DRAPE 7X11OD - (40EA/CA)

## (undated) DEVICE — MASK ANESTHESIA ADULT  - (100/CA)

## (undated) DEVICE — WATER IRRIGATION STERILE 1000ML (12EA/CA)

## (undated) DEVICE — SET LEADWIRE 5 LEAD BEDSIDE DISPOSABLE ECG (1SET OF 5/EA)

## (undated) DEVICE — SODIUM CHL IRRIGATION 0.9% 1000ML (12EA/CA)

## (undated) DEVICE — TUBE E-T HI-LO CUFF 7.0MM (10EA/PK)

## (undated) DEVICE — TUBING CLEARLINK DUO-VENT - C-FLO (48EA/CA)

## (undated) DEVICE — SET EXTENSION WITH 2 PORTS (48EA/CA) ***PART #2C8610 IS A SUBSTITUTE*****

## (undated) DEVICE — SENSOR SPO2 NEO LNCS ADHESIVE (20/BX) SEE USER NOTES

## (undated) DEVICE — VESSELOOP MAXI BLUE STERILE- SURG-I-LOOP (10EA/BX)

## (undated) DEVICE — SPONGE KITTNER DISSECTORS - (5EA/PK 50PK/CA)

## (undated) DEVICE — HUMID-VENT HEAT AND MOISTURE EXCHANGE- (50/BX)

## (undated) DEVICE — GLOVE, LITE (PAIR)

## (undated) DEVICE — SUTURE 5-0 PROLENE BLUE C-1 HS 1 X 30 (36EA/BX)"

## (undated) DEVICE — LACTATED RINGERS INJ 1000 ML - (14EA/CA 60CA/PF)

## (undated) DEVICE — CLIP MED INTNL HRZN TI ESCP - (25/BX)

## (undated) DEVICE — DRAPE IOBAN II INCISE 23X17 - (10EA/BX 4BX/CA)

## (undated) DEVICE — SUTURE 6-0 PROLENE C-1 D/A 24 (36PK/BX)"

## (undated) DEVICE — GUIDE PIN

## (undated) DEVICE — GOWN WARMING STANDARD FLEX - (30/CA)

## (undated) DEVICE — GLOVE BIOGEL SZ 8 SURGICAL PF LTX - (50PR/BX 4BX/CA)

## (undated) DEVICE — DRAPETIBURON SHOULDER W/POUCH - (5EA/CA)

## (undated) DEVICE — CHLORAPREP 26 ML APPLICATOR - ORANGE TINT(25/CA)

## (undated) DEVICE — PACK TOTAL HIP - (1/CA)

## (undated) DEVICE — PROTECTOR ULNA NERVE - (36PR/CA)

## (undated) DEVICE — GLOVE BIOGEL INDICATOR SZ 8 SURGICAL PF LTX - (50/BX 4BX/CA)

## (undated) DEVICE — NEEDLE W/FACET TIP DULL VERSION W/STIMULATION CABLE SONOPLEX 21G X 4 (10/EA)"

## (undated) DEVICE — SODIUM CHL. IRRIGATION 0.9% 3000ML (4EA/CA 65CA/PF)

## (undated) DEVICE — DRAPE SURGICAL U 77X120 - (10/CA)

## (undated) DEVICE — SUTURE GENERAL

## (undated) DEVICE — KIT ULTRASND COVER - (20EA/CA)

## (undated) DEVICE — Device

## (undated) DEVICE — SUTURE 3-0 SILK 12 X 18 IN - (36/BX)

## (undated) DEVICE — BLADE 90X18X1.27MM SAW SAGITTAL

## (undated) DEVICE — GLOVE SZ 8 BIOGEL PI MICRO - PF LF (50PR/BX)

## (undated) DEVICE — CLOSURE SKIN STRIP 1/2 X 4 IN - (STERI STRIP) (50/BX 4BX/CA)

## (undated) DEVICE — GLOVE BIOGEL SZ 7 SURGICAL PF LTX - (50PR/BX 4BX/CA)

## (undated) DEVICE — DRAPE LOWER EXTREMETY - (6/CA)

## (undated) DEVICE — STOCKINETTE, TUBULAR 6

## (undated) DEVICE — STAPLER SKIN DISP - (6/BX 10BX/CA) VISISTAT

## (undated) DEVICE — DISH PETRI STERILE (50EA/CA)

## (undated) DEVICE — DRAPE LARGE 3 QUARTER - (20/CA)

## (undated) DEVICE — SUCTION INSTRUMENT YANKAUER BULBOUS TIP W/O VENT (50EA/CA)

## (undated) DEVICE — GLOVE SZ 7.5 LF PROTEXIS (50PR/BX)

## (undated) DEVICE — NEPTUNE 4 PORT MANIFOLD - (20/PK)

## (undated) DEVICE — PACK MAJOR BASIN - (2EA/CA)

## (undated) DEVICE — CANISTER SUCTION RIGID RED 1500CC (40EA/CA)

## (undated) DEVICE — GLOVE BIOGEL PI ORTHO SZ 6 SURGICAL PF LF (40PR/BX)

## (undated) DEVICE — PEN SKIN MARKER W/RULER - (50EA/BX)

## (undated) DEVICE — DRAPE U ORTHOPEDIC - (10/BX)

## (undated) DEVICE — KIT SURGIFLO W/OUT THROMBIN - (6EA/CA)

## (undated) DEVICE — CATHETER EMBOLECTOMY 3FR (1EA)

## (undated) DEVICE — KIT ANESTHESIA W/CIRCUIT & 3/LT BAG W/FILTER (20EA/CA)

## (undated) DEVICE — HANDPIECE 10FT INTPLS SCT PLS IRRIGATION HAND CONTROL SET (6/PK)

## (undated) DEVICE — SUTURE 4-0 30CM STRATAFIX SPIRAL PS-2 (12EA/BX)

## (undated) DEVICE — SUTURE 2-0 VICRYL PLUS CT-1 36 (36PK/BX)"

## (undated) DEVICE — ELECTRODE 850 FOAM ADHESIVE - HYDROGEL RADIOTRNSPRNT (50/PK)

## (undated) DEVICE — CANISTER SUCTION 3000ML MECHANICAL FILTER AUTO SHUTOFF MEDI-VAC NONSTERILE LF DISP  (40EA/CA)

## (undated) DEVICE — DRAPE MAGNETIC (INSTRA-MAG) - (30/CA)

## (undated) DEVICE — APPLIER OPEN MEDIUM CLIP (6EA/BX)

## (undated) DEVICE — CLIP SM INTNL HRZN TI ESCP LGT - (24EA/PK 25PK/BX)

## (undated) DEVICE — SYRINGE 30 ML LL (56/BX)

## (undated) DEVICE — NEEDLE MAYO CATGUT TPR POINT - (2EA/PK20PK/BX)

## (undated) DEVICE — BLADE SURGICAL #11 - (50/BX)

## (undated) DEVICE — FIBERWIRE 2.0 (12EA/BX)

## (undated) DEVICE — SPIDER SHOULDER HOLDER (12EA/BX)

## (undated) DEVICE — SUTURE CV

## (undated) DEVICE — BAG SPONGE COUNT 10.25 X 32 - BLUE (250/CA)

## (undated) DEVICE — ELECTRODE DUAL RETURN W/ CORD - (50/PK)

## (undated) DEVICE — TIP INTPLS HFLO ML ORFC BTRY - (12/CS)  FOR SURGILAV

## (undated) DEVICE — DECANTER FLD BLS - (50/CA)

## (undated) DEVICE — DEVICE MONOPOLAR RF PEAK PLASMABLADE 3.0S

## (undated) DEVICE — SUTURE 0 VICRYL PLUS CT-1 - 8 X 18 INCH (12/BX)

## (undated) DEVICE — GLOVE 7.0 LF PF PROTEXIS (50PR/BX)

## (undated) DEVICE — TOWELS CLOTH SURGICAL - (4/PK 20PK/CA)

## (undated) DEVICE — SUTURE 0 VICRYL PLUS CT-1 - 36 INCH (36/BX)

## (undated) DEVICE — SUTURE 3-0 MONOCRYL PLUS PS-2 - (12/BX)

## (undated) DEVICE — DRESSING POST OP BORDER 4 X 10 (5EA/BX)

## (undated) DEVICE — TUBE CONNECTING SUCTION - CLEAR PLASTIC STERILE 72 IN (50EA/CA)

## (undated) DEVICE — SODIUM CHL. INJ. 0.9% 500ML (24EA/CA 50CA/PF)

## (undated) DEVICE — DRESSING AQUACEL AG ADVANTAGE 3.5 X 10" (10EA/BX)"

## (undated) DEVICE — STERI STRIP COMPOUND BENZOIN - TINCTURE 0.6ML WITH APPLICATOR (40EA/BX)

## (undated) DEVICE — SOD. CHL. INJ. 0.9% 1000 ML - (14EA/CA 60CA/PF)